# Patient Record
Sex: MALE | Race: BLACK OR AFRICAN AMERICAN | URBAN - METROPOLITAN AREA
[De-identification: names, ages, dates, MRNs, and addresses within clinical notes are randomized per-mention and may not be internally consistent; named-entity substitution may affect disease eponyms.]

---

## 2017-04-10 ENCOUNTER — HOSPITAL ENCOUNTER (OUTPATIENT)
Dept: BEHAVIORAL HEALTH | Facility: CLINIC | Age: 57
Discharge: HOME | End: 2017-04-10
Attending: SOCIAL WORKER | Admitting: SOCIAL WORKER
Payer: COMMERCIAL

## 2017-04-10 ENCOUNTER — TELEPHONE (OUTPATIENT)
Dept: BEHAVIORAL HEALTH | Facility: CLINIC | Age: 57
End: 2017-04-10

## 2017-04-10 VITALS
HEIGHT: 66 IN | SYSTOLIC BLOOD PRESSURE: 172 MMHG | WEIGHT: 158 LBS | HEART RATE: 124 BPM | BODY MASS INDEX: 25.39 KG/M2 | DIASTOLIC BLOOD PRESSURE: 91 MMHG

## 2017-04-10 PROCEDURE — H0001 ALCOHOL AND/OR DRUG ASSESS: HCPCS

## 2017-04-10 ASSESSMENT — PAIN SCALES - GENERAL: PAINLEVEL: NO PAIN (0)

## 2017-04-10 ASSESSMENT — ANXIETY QUESTIONNAIRES
6. BECOMING EASILY ANNOYED OR IRRITABLE: NOT AT ALL
3. WORRYING TOO MUCH ABOUT DIFFERENT THINGS: NOT AT ALL
5. BEING SO RESTLESS THAT IT IS HARD TO SIT STILL: NOT AT ALL
GAD7 TOTAL SCORE: 0
7. FEELING AFRAID AS IF SOMETHING AWFUL MIGHT HAPPEN: NOT AT ALL
4. TROUBLE RELAXING: NOT AT ALL
2. NOT BEING ABLE TO STOP OR CONTROL WORRYING: NOT AT ALL
1. FEELING NERVOUS, ANXIOUS, OR ON EDGE: NOT AT ALL

## 2017-04-10 NOTE — PROGRESS NOTES
[facility]  [address] [place] [address]               ADULT CD ASSESSMENT      Additional Clinical Questions - Outpatient    Patient Name: Jerad Alvarado  Cell Phone:   Home: [phone] (home)    Mobile:   Telephone Information:   Mobile [phone]       Email:  BECKY  Emergency Contact: Linda Jang  wife   Tel: [phone]    ________________________________________________________________________      The patient is      With which race do you identify? / Black    Please list your family members and if they are living or , i.e. (grandparents, parents, step-parents, adoptive parents, number of siblings, half-siblings, etc.)     Mother   Living Father    No Step-mother   NA No Step-father NA   Maternal Grandmother    Fraternal Grandmother    Maternal Grandfather    NA Fraternal Grandfather NA   7 Sister(s) 6 Living and 1  3 Brother(s)   2 Living and 1    No Half-sister(s)   NA No Half-brother(s) NA             Who raised you? (parents, grandparents, adoptive parents, step-parents, etc.)    Mother    Have any of your family members or significant others had problems with mental illness or substance abuse?  Please explain.    Father, maternal grandmother    Do you have any children or Stepchildren? Yes, please explain: Lucas  26, Dorinda Jenkins  28, Clare 25    Are you being investigated by Child Protection Services? No    Do you have a child protection worker, probation office or ? No    How would you describe your current finances?  Doing okay    If you are having problems, (unpaid bills, bankruptcy, IRS problems) please explain:  No    If working or a student are you able to function appropriately in that setting? Yes    Describe your preferred learning style:  by hands-on practice    What personal strengths do you have that can help you get sober?  Godd father/, hard worker    Do you currently  self-administer your medications?  Yes    Have you ever:    Had to lie to people important to you about how much you monet?     No     Felt the need to bet more and more money?      No     Attempted treatment for a gambling problem?        No     Touched or fondled someone else inappropriately, or forced them to have sex with you against their will?       No     Are you or have you ever been a registered sex offender?        No     Is there any history of sexual abuse in your family?        No     Cutler obsessed by your sexual behavior (having sex with many partners, masturbating often, using pornography often?        No     Received therapy or stayed in the hospital for mental health problems?        No     Hurt yourself (cutting, burning or hitting yourself)?        No     Purged, binged or restricted yourself as a way to control your weight?      No       Are you on a special diet?       No       Do you have any concerns regarding your nutritional status?        No       Have you had any appetite changes in the last 3 months?        No       Have you had any weight loss or weight gain in the last 3 months?  If yes, how much gain or loss:     If weight patient gains more than 10 lbs or loses more than 10 lbs, refer to program RN /  Attending Physician for assessment.    No        Was the patient informed of BMI?      Above,  General nutrition education   Yes     Do you have any dental problems?        No     Lived through any trauma or stressful events?        Yes, If yes explain: his sister , 3 years ago from an unexpected heart attack.      In the past month, have you had any of the following symptoms related to the trauma listed above? (Dreams, intense memories, flashbacks, physical reactions, etc.)         No     Believed that people are spying on you, or that someone was plotting against you or trying to hurt you?       No     Believed that someone was reading your mind or could hear your thoughts or that  "you could actually read someone's mind, hear what another person was thinking?       No     Believed that someone or some force outside of yourself put thoughts in your mind that were not your own, or made you act in a way that was not your usual self?  Or have you ever thought you were possessed?         No     Believed that you were being sent special messages through the TV, radio or newspaper?         No     [place] things other people couldn't hear, such as voices?         No     Had visions when you were awake?  Or have you ever seen things other people couldn't see?       Yes, If yes explain: thought he saw ghost when he was a little kid.          Suicide Screening Questions:    1. Are you feeling hopeless about the present/future?   No   2. Have you ever had thoughts about taking your life?   No   3. When did you have these thoughts? NA   4. Do you have any current intent or active desire to take your life?   No   5. Do you have a plan to take your life?    No   6. Have you ever made a suicide attempt?   No   7. Do you have access to pills, guns or other methods to kill yourself?   No       Risk Status - Use as Guide/Example    Ideation - Active  Thoughts of suicide Intent to follow  Through on suicide Plan for completing  suicide    Yes No Yes No Yes No   Emergent X  X  X    Urgent / Non-Emergent X  X   X   Non-Urgent X   X  X   No Current / Active Risk (Past 6 Months)  X  X  X   Jerad Alvarado No No No       Additional Risk Factors: No addtional risk factors   Protective Factors:  Having people in his/her life that would prevent the patient from considering committing suicide (i.e. young children, spouse, parents, etc.)     Risk Status:    Emergent? No  Urgent / Non-Emergent?  No  Present / Non- Urgent? Yes, Document in Epic / Critical MediaAR to counselor, Collaborate with patient / client to develop \"Patient Safety Plan\", Address in Treatment Plan, Continuous monitoring, assessment and intervention and Address in " Discharge / Transition Plan      No Current Risk? See above    Additional information to support suicide risk rating: See Above    Mental Status Assessment    Physical Appearance/Attire:  Appears stated age  Hygiene:  well groomed  Eye Contact:  at examiner  Speech:  regular  Speech Volume:  regular  Speech Quality: fluid  Cognitive/Perceptual:  reality based  Cognition:  memory intact   Judgment:  intact  Insight:  intact  Orientation:  time, place, person and situation  Thought:  logical   Hallucinations:  none  General Behavioral Tone:  cooperative  Psychomotor Activity:  no problem noted  Gait:  no problem  Mood:  normal and subdued, flat, GUZMAN was .048 which may have effected his mood.  Affect:  flat/none and blunted/restricted    Criteria for Diagnosis  DSM-5 Criteria for Substance Abuse    SUMMARY OF CHEMICAL DEPENDENCY SYMPTOMS ACKNOWLEDGED BY THE PATIENT:  He identifies 4 of the DSM-V criteria for diagnosis of substance dependence.  He spends a good deal of time using alcohol, has continued to use despite major obligations at home.  He has continued to use despite persistent recurrent interpersonal problems, especially with his wife and his occupational and his drinking has affected his work.      IMPRESSION:   1.  Alcohol use disorder, moderate, F10.20/303.90.   2.  Tobacco use disorder, Z72.20/305.1.      [facility] PLACEMENT CRITERIA:   DIMENSION 1:  Intoxication/withdrawal:  1.  On the date of his evaluation, his UA was not available as he could not produce a specimen.  His GUZMAN was 0.048, although he states he has not drank since 6:00 yesterday when he had a total of 4 drinks.  He did identify current symptoms of alcohol withdrawal, namely elevated pressure, but he was still under the influence and does have a history of high blood pressure, although he took his blood pressure medication this morning.  He denies a history of any previous detoxes.  He states around 9:00 this a.m., he went to [facility] where they mohan blood, determined that he was intoxicated, but not in need of detoxification.      DIMENSION 2:  Biomedical condition:  1.  Client denies having any chronic biomedical NA and AA conditions that would interfere with CD treatment.  He does endorse having the following medical conditions, namely hypertension, high cholesterol and diabetes managed with oral meds.  He is currently on glipizide, metformin, hypertensive medications and high cholesterol medications.  He does have insurance.  He does have a PCP in [place].  His blood pressure remains high today which I believe may be a factor of withdrawal.  He was checked out at the [facility] who said he did not need to be admitted and was stable enough to go home.        DIMENSION 3:  Emotional Behavioral:  1.  On the date of assessment, his PHQ-9 was 0/27, his SHEFALI-7 was 0/21.  He reported never being diagnosed with any MI issues.  Is not on any psych medications nor is he seeing a therapist.  He denies a history of SI/SA/SIB/HI/HA.      He grew up in [place], [place] until the age of 22, then went into the , has been in [place] since .  He was raised by his mom.  States he had a fun childhood.  He had 10 siblings, 3 brothers and 7 sisters.  He was the 8th one.  One sister and 1 brother have .  His father and maternal grandmother were alcoholic.  He denies a history of any mental health issues in the family.  Growing up states he got normal whippings from his mom as was common then.  It felt that his mom supported him 100% as best she could.      DIMENSION 4:  Readiness for Change:  3.  He verbalizes motivation to abstain from all mood-altering chemicals, but has lacked the consistent behavior to support abstinence.  He is at the precontemplative state of change.  He was cooperative with the evaluation process, appeared compliant with the idea of sobriety and given treatment recommendations.  However, he said he really  did not think he had a drinking problem and is here mainly because of pressure from his work and his wife.      DIMENSION 5:  Relapse potential:  3.  Jerad has continued to abuse alcohol despite negative consequences in multiple life areas.  He has had no CD treatments.  He lacks relapse prevention and sober living refusal skills.  There is a family history of addiction, father and maternal grandmother.  He appeared to be severely minimizing his drinking and reported having 4 drinks by 6:00 p.m. yesterday, but blew over a 0.20 at work this morning and still a 0.04 at 4:10 this afternoon.  He is at high risk of relapse because he has been a daily drinker for over a year, severely minimizing his use, does not think he has a problem and is only here to placate his work and wife.  He appears to be solely externally motivated at this time.      DIMENSION 6:  Recovery environment:  2.  Jerad lives with his supportive wife of 31 years, but has continued to drink excessively on a daily basis for the last year.  He said he has no friends and lacks healthy chemically free leisure activities and interests.  He denies a history of any legal issues and has a 's license.  He has very minimal support beyond his immediate family.  He has never been to  or any sober support group and is not active in any [facility].      RECOMMENDATIONS:   1.  That he abstain from alcohol and all mood-altering drugs unless prescribed by a health caregiver familiar with the patient's addiction.   2.  That he enter and complete IOP at [facility] or similar and follow counselor's recommendations.   3.  That he develop a sober support network.   4.  That he continue to receive appropriate medical and psychiatric services as needed.     5.  That he attend a minimum of one 12-step sober support group a week.   6.  That he enter  or similar.  If he is unable to maintain sobriety in an IOP setting.        INITIAL PROBLEM LIST:   1.  He lacks  relapse prevention and sober living skills.   2.  He appears to have no insight into the seriousness of his problem and appears to be significantly minimizing his alcohol use and the consequences.   3.  He appears to be solely externally motivated for treatment at this time by work and his wife.   4.  He has a very minimal sober support system beyond his immediate family.         Patient/Client is willing to follow treatment recommendations.  Yes    [place], [facility]      Vulnerable Adult Checklist for OUTPATIENTS     1.  Do you have a physical, emotional or mental infirmity or dysfunction?       No    2.  Does this issue impair your ability to provide for your own care without help, including providing yourself with food, shelter, clothing, healthcare or supervision?       No    3.  Because of this issue, I need assistance to protect myself from maltreatment by others.      No    Based on the above information:    This person is not a functional Vulnerable Adult according to [place] Statute 626.5572 subdivision 21.             This person has a history of abuse, but is assessed as stable and not in need of an individual abuse prevention plan beyond the program abuse prevention plan.

## 2017-04-10 NOTE — PROGRESS NOTES
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 support does the person have for making changes? What structure/stability does the person have in his or her daily life that will increase the likelihood that changes can be sustained? What problems exist in the person s environment that will jeopardize getting/staying clean and sober?)     Jerad lives with his supportive wife of 31 years, but has continued to drink excessively on a daily basis for the last year. He said he has no friends and lacks healthy chemically free leisure activities and interests. He denies a history of any legal issues and has a DL. He has very minimal support beyond his immediate family. He has never been to AA, any sober support group and is not active in any Christianity.         Client Choice/Exceptions   Would you like services specific to language, age, gender, culture, Alevism preference, race, ethnicity, sexual orientation or disability?  Yes - EOP at [facility].    What particular treatment choices and options would you like to have? See above.    Do you have a preference for a particular treatment program? See above.    Criteria for Diagnosis     Criteria for Diagnosis  DSM-5 Criteria for Substance Use Disorder  Instructions: Determine whether the client currently meets the criteria for Substance Use  Disorder using the diagnostic criteria in the DSM-V pp.481-587. Current means during the most recent 12 months outside a facility that controls access to substances    Category of Substance Severity (ICD-10 Code / DSM 5 Code)     Alcohol Use Disorder Moderate  (F10.20) (303.90)   Cannabis Use Disorder NA   Hallucinogen Use Disorder NA   Inhalant Use Disorder NA   Opioid Use Disorder NA   Sedative, Hypnotic, or Anxiolytic Use Disorder NA   Stimulant Related Disorder NA   Tobacco Use Disorder Mild    (Z72.0) (305.1)   Other (or unknown) Substance Use Disorder NA       Collateral Contact Summary   Number of contacts made: 1  His wife of 31 years.    Contact with referring person:  Self/wife.    If court related records were reviewed, summarize here: NA    Information from collateral contacts was significantly different from information from the client and lead to different risk ratings. Summarize here: see below.      Rule 25 Assessment Summary and Plan   's Recommendation    1) Abstain from alcohol and all mood altering drugs unless prescribed by a healthcare giver familiar with the patient's addiction.    2) Enter and complete IOP at [facility] or similar and follow counselor recommendations.   3) Develop a sober supportive network.  4) Continue to receive appropriate medical and psychiatric services as needed.  5) Attend a minimum of one 12 step or similar sober support group a week.  6) That he enter  or similar if he is unable to maintain sobriety in an IOP setting.   7) That he do random BAC's while in tx and possibly at work at least during the duration of Phase I and Phase II of tx.          Collateral Contacts     Name:    Linda Alvarado   Relationship:    wife   Phone Number:    [phone] Releases:    Yes     Linda said she has been concerned about Jerad's excessive drinking for about 4-5 years, more so in the last year. She said he drinks every night, claims to have only one drink but often  appears to be inebriated. He denies that he is drinking too much, even though she said he smells of etoh. She said she received a call this AM to pick him up at work because he smelled of etoh, had an elevated GUZMAN, and appeared wobbly and unsteady. She said she picked him up around 9, and he smelled of etoh but seemed ok. She took him to the [facility] [place] where they took blood, confirmed that he was intoxicated, that his BP was elevated but released him thinking he didn't need admission.       Collateral Contacts     Name:    Maylin   Relationship:    HR at [employer]   Phone Number:    [phone]   Releases:    Yes     I couldn't get through using that number.     ollateral Contacts      A problematic pattern of alcohol/drug use leading to clinically significant impairment or distress, as manifested by at least two of the following, occurring within a 12-month period:    A great deal of time is spent in activities necessary to obtain alcohol, use alcohol, or recover from its effects.  Recurrent alcohol/drug use resulting in a failure to fulfill major role obligations at work, school or home.  Continued alcohol use despite having persistent or recurrent social or interpersonal problems caused or exacerbated by the effects of alcohol/drug.  Important social, occupational, or recreational activities are given up or reduced because of alcohol/drug use.      Specify if: In early remission:  After full criteria for alcohol/drug use disorder were previously met, none of the criteria for alcohol/drug use disorder have been met for at least 3 months but for less than 12 months (with the exception that Criterion A4,  Craving or a strong desire or urge to use alcohol/drug  may be met).     In sustained remission:   After full criteria for alcohol use disorder were previously met, non of the criteria for alcohol/drug use disorder have been met at any time during a period of 12 months or longer (with the exception that  Criterion A4,  Craving or strong desire or urge to use alcohol/drug  may be met).   Specify if:   This additional specifier is used if the individual is in an environment where access to alcohol is restricted.    Mild: Presence of 2-3 symptoms    Moderate: Presence of 4-5 symptoms    Severe: Presence of 6 or more symptoms

## 2017-04-10 NOTE — TELEPHONE ENCOUNTER
S:  4/10/17 Received call from Linda (the spouse) referring client for   Chemical Dependency evaluation for OP TX.   B:  Reported the client drinks alcohol daily. He denies other drug use,  denies previous CD TX, denies being prescribed antidepressants,   denies legal issues.   A:  CD evaluation   Medical concerns: Hx of Diabetes, Hx of High Blood Pressure, and   Hx of High Cholesterol.   R:  Scheduled for today 4/10/17 @ 3:30 pm, routed to CD Program. COLE

## 2017-04-11 ASSESSMENT — ANXIETY QUESTIONNAIRES: GAD7 TOTAL SCORE: 0

## 2017-04-11 ASSESSMENT — PATIENT HEALTH QUESTIONNAIRE - PHQ9: SUM OF ALL RESPONSES TO PHQ QUESTIONS 1-9: 0

## 2017-04-11 NOTE — PROGRESS NOTES
CHEMICAL DEPENDENCY ASSESSMENT      EVALUATION COUNSELOR:  HERRERA Boles, [facility].    PATIENT'S ADDRESS:  [address] [place] [address]  [id number].   PHONE NUMBER:  [phone].   STATISTICS:  Age:  56.  .  Sex:  Male.   DATE OF ASSESSMENT:  04/10/2017.   REFERRAL SOURCE:  Self.   DATE OF DICTATION:  04/10/2017.     HR at [employer] Maylin Montano  [phone]  Fax [phone]     REASON FOR ASSESSMENT:  Jerad Alvarado states he reported to work this morning and around 7:00 or so was confronted at work for appearing intoxicated.  They state he was wobbly, smelled of alcohol.  His GUZMAN was over 0.20.  His wife was called.  She was asked to pick him up and he is temporarily suspended until he has an assessment, etc.      OUTPATIENT HEALTH HISTORY:  On the date of assessment, his blood pressure was high initially 172/91 with a heart rate of 124, about an hour later, 164/97, with heart rate of 114.  His BMI was 25.50.  His GUZMAN at 4:15 p.m. was 0.048.  He states he has a history of high blood pressure, high cholesterol and diabetes managed with oral medications.      He is on the following medications:  Glipizide b.i.d., metformin and then hypertension medications and high cholesterol medications.  He did not know their names.  He denies any current pain.  Denies any current allergies.  States he has insurance and a doctor in [place].      HISTORY OF PREVIOUS TREATMENT AND COUNSELING:  He denies a history of any previous chemical dependency treatment counseling, 12-step involvement or detoxes.  He states this is his first assessment.      HISTORY OF ALCOHOL AND DRUG USE:  States he first drank alcohol around the age of 25, states he did not drink much in his 20s, 30s and 40s.  States in the last year or so he has been drinking daily, typically a beer and an occasional shot of hard liquor.  States his last use was 04/09/2017 having 3 shots of errol and a beer at 6:00 at night.      It appears the patient is  probably significantly minimizing his use of alcohol.  He states his last use of alcohol was 04/09/2017 at 6:00 yet his GUZMAN at 4:15 on 04/10 was still 0.048.      States he used pot once as a teenager.  States at 25 he occasionally snorted and smoked coke, last use was at 25.  States he used acid at 23 a few times.  States he started smoking at 25 and then quit for a long time.  States he has smoked from [age] about 4 cigarettes a day.      SUMMARY OF CHEMICAL DEPENDENCY SYMPTOMS ACKNOWLEDGED BY THE PATIENT:  He identifies 4 of the DSM-V criteria for diagnosis of substance dependence.  He spends a good deal of time using alcohol, has continued to use despite major obligations at home.  He has continued to use despite persistent recurrent interpersonal problems, especially with his wife and his occupational and his drinking has affected his work.      SUMMARY OF COLLATERAL DATA:  I did gather collateral data from his wife, Linda, his wife of 31 years.  She states she has been concerned about his excessive drinking for about 4-5 years, more so in the last year.  She states that he drinks every night and claims to have only 1 drink but often appears quite inebriated.  He denies that drinking very much, although he often smells of alcohol.  She states she received the call from work this morning to pick him up because he smelled of alcohol and had an elevated GUZMAN and appeared wobbly and unsteady.  She states she picked him up around 9:00.  He smelled of alcohol, but seemed okay.  She took him to the [facility] where they took blood and confirmed that he was intoxicated.  His blood pressure was elevated but released him thinking he did not need admission.      MENTAL HEALTH STATUS:  On the date of assessment, he appeared his stated age.  He was well groomed, maintained eye contact at the examiner.  Speech rate regular.  Speech volume regular.  Speech quality fluid.  Perception reality based.  Memory intact.   Judgment intact.  Insight intact.  Oriented to time, place, person and situation.  Thoughts logical, evidenced no hallucinations.  General behavior tone was cooperative, evidenced no psychomotor problems.  Gait:  No problem.  Mood was normal and subdued, flat.  GUZMAN was 0.048, which may have affected his mood.  Affect was flat, none and blunted and restricted.      VULNERABLE ADULT ASSESSMENT:  Jerad Alvarado is not a functional vulnerable adult according to [place] Statute 626.5572, subdivision 21.      IMPRESSION:   1.  Alcohol use disorder, moderate, F10.20/303.90.   2.  Tobacco use disorder, Z72.20/305.1.      [facility] PLACEMENT CRITERIA:   DIMENSION 1:  Intoxication/withdrawal:  1.  On the date of his evaluation, his UA was not available as he could not produce a specimen.  His GUZMAN was 0.048, although he states he has not drank since 6:00 yesterday when he had a total of 4 drinks.  He did identify current symptoms of alcohol withdrawal, namely elevated pressure, but he was still under the influence and does have a history of high blood pressure, although he took his blood pressure medication this morning.  He denies a history of any previous detoxes.  He states around 9:00 this a.m., he went to [facility] where they mohan blood, determined that he was intoxicated, but not in need of detoxification.      DIMENSION 2:  Biomedical condition:  1.  Client denies having any chronic biomedical NA and AA conditions that would interfere with CD treatment.  He does endorse having the following medical conditions, namely hypertension, high cholesterol and diabetes managed with oral meds.  He is currently on glipizide, metformin, hypertensive medications and high cholesterol medications.  He does have insurance.  He does have a PCP in [place].  His blood pressure remains high today which I believe may be a factor of withdrawal.  He was checked out at the [facility] who said he did not need to be admitted and  was stable enough to go home.        DIMENSION 3:  Emotional Behavioral:  1.  On the date of assessment, his PHQ-9 was 0/27, his SHEFALI-7 was 0/21.  He reported never being diagnosed with any MI issues.  Is not on any psych medications nor is he seeing a therapist.  He denies a history of SI/SA/SIB/HI/HA.      He grew up in [place], [place] until the age of 22, then went into the , has been in [place] since .  He was raised by his mom.  States he had a fun childhood.  He had 10 siblings, 3 brothers and 7 sisters.  He was the 8th one.  One sister and 1 brother have .  His father and maternal grandmother were alcoholic.  He denies a history of any mental health issues in the family.  Growing up states he got normal whippings from his mom as was common then.  It felt that his mom supported him 100% as best she could.      DIMENSION 4:  Readiness for Change:  3.  He verbalizes motivation to abstain from all mood-altering chemicals, but has lacked the consistent behavior to support abstinence.  He is at the precontemplative state of change.  He was cooperative with the evaluation process, appeared compliant with the idea of sobriety and given treatment recommendations.  However, he said he really did not think he had a drinking problem and is here mainly because of pressure from his work and his wife.      DIMENSION 5:  Relapse potential:  3.  Jerad has continued to abuse alcohol despite negative consequences in multiple life areas.  He has had no CD treatments.  He lacks relapse prevention and sober living refusal skills.  There is a family history of addiction, father and maternal grandmother.  He appeared to be severely minimizing his drinking and reported having 4 drinks by 6:00 p.m. yesterday, but blew over a 0.20 at work this morning and still a 0.04 at 4:10 this afternoon.  He is at high risk of relapse because he has been a daily drinker for over a year, severely minimizing his use, does not  think he has a problem and is only here to placate his work and wife.  He appears to be solely externally motivated at this time.      DIMENSION 6:  Recovery environment:  2.  Jerad lives with his supportive wife of 31 years, but has continued to drink excessively on a daily basis for the last year.  He said he has no friends and lacks healthy chemically free leisure activities and interests.  He denies a history of any legal issues and has a 's license.  He has very minimal support beyond his immediate family.  He has never been to  or any sober support group and is not active in any Jewish.      RECOMMENDATIONS:   1.  That he abstain from alcohol and all mood-altering drugs unless prescribed by a health caregiver familiar with the patient's addiction.   2.  That he enter and complete IOP at [facility] or similar and follow counselor's recommendations.   3.  That he develop a sober support network.   4.  That he continue to receive appropriate medical and psychiatric services as needed.     5.  That he attend a minimum of one 12-step sober support group a week.   6.  That he enter  or similar.  If he is unable to maintain sobriety in an IOP setting.  7.  That he do random BAC's while in tx and possibly at work at least during the duration of Phase I and Phase II of tx.          INITIAL PROBLEM LIST:   1.  He lacks relapse prevention and sober living skills.   2.  He appears to have no insight into the seriousness of his problem and appears to be significantly minimizing his alcohol use and the consequences.   3.  He appears to be solely externally motivated for treatment at this time by work and his wife.   4.  He has a very minimal sober support system beyond his immediate family.         This information has been disclosed to you from records protected by Federal confidentiality rules (42 CFR part 2). The Federal rules prohibit you from making any further disclosure of this information unless  further disclosure is expressly permitted by the written consent of the person to whom it pertains or as otherwise permitted by 42 CFR part 2. A general authorization for the release of medical or other information is NOT sufficient for this purpose. The Federal rules restrict any use of the information to criminally investigate or prosecute any alcohol or drug abuse patient.      RODNEY CARLOS [facility] [place], LICSW             D: 04/10/2017 19:03   T: 04/10/2017 20:50   MT: ANA MARIA      Name:     CAYLA CHILDRESS   MRN:      [id number]        Account:      [id number]   :      1960           Visit Date:   04/10/2017      Document: [id number]

## 2017-04-11 NOTE — TELEPHONE ENCOUNTER
4/11/2017  Please schedule patient for orientation at [facility] for Wed 4/12/17 and start Karo's group when there is room. He was referred to BJS's office as he has a high deductible. He has [facility]. I talked with Lucio at [phone] who said his auth is managed by local [facility]  [phone], so I will e-mail the usual [facility] form to Basil GUTHRIEAR    Name:   Jerad Alvarado YOB: 1960 Age:  56 year old Gender:  male   Insurance:   [facility] form with be faxed to Karo   Precipitating Event:   Pt showed up for work intoxicated on 4/10/17, sent home, suspended and required to have a CD eval.    DOC:   Alcohol and Nicotine  Additional abused substances:   None   Medical:   Hypertension, High Cholesterol and Diabetes non-insulin dependent   Mental Health:   No current clinical mental health issues   Previous Treatments:  No prior IP detoxification admission(s).  No prior CD treatment(s).   Psychosocial:    3 adult child(eran)  Stable housing and no concerns  Minimal support network   Suicide Screening Questions:     1. Are you feeling hopeless about the present/future? No   2. Have you ever had thoughts about taking your life? No   3. When did you have these thoughts? NA   4. Do you have any current intent or active desire to take your life? No   5. Do you have a plan to take your life?  No   6. Have you ever made a suicide attempt? No   7. Do you have access to pills, guns or other methods to kill yourself? No                  Risk Status - Use as Guide/Example     Ideation - Active  Thoughts of suicide Intent to follow  Through on suicide Plan for completing  suicide    Yes No Yes No Yes No   Emergent X   X   X     Urgent / Non-Emergent X   X     X   Non-Urgent X     X   X   No Current / Active Risk (Past 6 Months)   X   X   X   Jerad Alvarado No No No         Additional Risk Factors: No addtional risk factors   Protective Factors:  Having people in his/her life that would prevent the patient from  "considering committing suicide (i.e. young children, spouse, parents, etc.)      Risk Status:     Emergent? No  Urgent / Non-Emergent?  No  Present / Non- Urgent? Yes, Document in Epic / SBAR to counselor, Collaborate with patient / client to develop \"Patient Safety Plan\", Address in Treatment Plan, Continuous monitoring, assessment and intervention and Address in Discharge / Transition Plan   No Current Risk? See above     Additional information to support suicide risk rating: See Above          Additional Info as needed: NA       "

## 2017-04-12 ENCOUNTER — HOSPITAL ENCOUNTER (OUTPATIENT)
Dept: BEHAVIORAL HEALTH | Facility: CLINIC | Age: 57
End: 2017-04-12
Attending: SOCIAL WORKER
Payer: COMMERCIAL

## 2017-04-12 ENCOUNTER — BEH TREATMENT PLAN (OUTPATIENT)
Dept: BEHAVIORAL HEALTH | Facility: CLINIC | Age: 57
End: 2017-04-12
Attending: FAMILY MEDICINE

## 2017-04-12 PROBLEM — F19.10 SUBSTANCE ABUSE (H): Status: ACTIVE | Noted: 2017-04-12

## 2017-04-12 PROCEDURE — H2035 A/D TX PROGRAM, PER HOUR: HCPCS | Mod: HQ

## 2017-04-13 NOTE — PROGRESS NOTES
Initial Services Plan        Before your first treatment group, please do the following    Immediate health & safety concerns:   Go to the emergency room if you start to have withdrawal symptoms.  Look for sober housing and a supportive social network.  Look for a support network (such as AA, NA, DBT group, a Judaism group, etc.)    Suggestions for client during the time between intake & completion of treatment plan:  Introduce yourself to the treatment group.  Spend time getting to know your peers.  Complete the problem list for your treatment plan.  Start drug and alcohol use history.  Review your patient or client handbook.  Identify concerns about whom to ask for family week    Client issues to be addressed in the first treatment sessions:  Identify motivations(s) for coming to treatment, i.e. legal, family, job, self  Identify concerns about going to group, i.e. fear of talking in group      HERRERA Ngo  4/12/2017  9:29 PM

## 2017-04-13 NOTE — TELEPHONE ENCOUNTER
----- Message from Karo Dickinson sent at 4/12/2017  8:13 PM CDT -----  Regarding: Phase 1  Please schedule this client for Phase 1 Maryana [id number] starting 04/24/2017 for 20 sessions.  Specifically for 5:30pm.    Thank you,  Karo

## 2017-04-13 NOTE — PROGRESS NOTES
"Orientation to [facility] Program  Date 04/12/2017  Time: 7:30pm Duration: 1 hour    D - Client attended orientation on this date by himself. Client was given an orientation packet which was reviewed with in it's entirety. The following was specifically reviewed with the client: Program philosophy, treatment goals, program schedules, education components, the family program, using treatment materials, program rules, client rights/responsibilities, information on HIV, STDS, Hepatitis, TB, information on use while pregnant, opioid information and Narcan Information, program abuse prevention plan/reporting protocol, client grievance procedure, risks of treatment, confidentiality, treatment agreement, facility tour/safety information and information on co-occurring disorders. Client was given information about insurance and the central billing office phone number if there are any further questions. Client was also given information on Stages of Change and PAW (post-acute withdrawal). Client was given counselor's number and also manager's number if there are further concerns.     I - Client signed paperwork, toured the facility, scheduled first group Phase 1 session and discussed treatment planning. Client also filled out goals for treatment. They include:  1) \"sobriety\"   2)  3)    A - Client appeared engaged.      P- Client will review paperwork and materials.  Client will come ready and prepared for his first session on 04/24/2017  Client will set up a time for treatment planning.      HERRERA Ngo      "

## 2017-04-13 NOTE — TELEPHONE ENCOUNTER
4/13/2017    Client's HR contact Maylin called [employer] and he forwarded the messages to me. She is looking for when he could return to work on and some other information I was unable to catch. She left her number for a call back.     Karo Dickinson, HERRERA

## 2017-04-13 NOTE — PROGRESS NOTES
Outcome of vulnerable Adult Assessment for Outpatients:    1.  Do you have a physical, emotional or mental infirmity or dysfunction?       Yes (explain) - chemical dependency    2.  Does this issue impair your ability to provide for your own care without help, including providing yourself with food, shelter, clothing, healthcare or supervision?       No      3.  Because of this issue, I need assistance to protect myself from maltreatment by others.      No    Based on the above information:    This person is not a functional Vulnerable Adult according to [place] Statute 626.5572 subdivision 21.      HERRERA Ngo

## 2017-04-13 NOTE — TELEPHONE ENCOUNTER
4/13/2017    Left Maylin a message letting her know I'm the counselor and contact from here on out. I left my number and times I'm available for a call back.    HERRERA Ngo

## 2017-04-20 NOTE — TELEPHONE ENCOUNTER
4/20/2017  Linda Alvarado and counselor exchanged emails this morning. See below:    ----------------------------------------------------------------  Ok.  I just left a message for Shae. Thank you for you help.    Linda Alvarado  [facility] [place] [place] [facility]    [phone]      [email or web address]    On Thu, Apr 20, 2017 at 11:51 AM, Karo Dickinson <[email or web address]> wrote:  Hello again,   I forgot to mention in my last email that he was supposed to contact the billing office. I am not sure if that s been done yet, but the number there is [phone].   Thanks,   HERRERA Ngo     From: Linda Alvarado [[email or web address]]   Sent: Thursday, April 20, 2017 11:19 AM  To: Karo Dickinson  Subject: Re: Jerad Jenny     That does help, Thank you.     Linda Alvarado  [facility] [place] [place] [facility]    [phone]      On Thu, Apr 20, 2017 at 11:16 AM, Karo Dickinson <[email or web address]> wrote:  I spoke with Maylin from his HR and she made it sound like he ll come to group for a week and then her and I will touch base the 26th/27th and if everything s going well with him he could go back the first week of May.    Hope that answers your question.   HERRERA Ngo     From: Linda Alvarado [[email or web address]]   Sent: Thursday, April 20, 2017 11:02 AM  To: Karo Dickinson  Subject: Re: Jerad Jangy     ?Thank you for responsding.   I do have a question regarding when Jerad will be able to go back to work?  You might not know the answer right now and if it could be next week I don't feel I need to fill out the paperwork for FMLA or Disability?    Let me now what you think?   Thanks      Linda Alvarado  [facility] [place] [place] [facility]    [phone]  [phone]      [email or web address]     On Thu, Apr 20, 2017 at 10:52 AM, Karo Dickinson <[email or web address]> wrote:  Hello,     I am unable to fill out FMLA paperwork. You can go to your primary provider or clinic and they  can fill those out.   Please let me know if you have any other questions.     Karo Dickinson, [facility]  [address], [address]  [place] [address]  Ph. [phone]  Fax. [phone]  [email or web address]     From: Linda Alvarado [[email or web address]]   Sent: Thursday, April 20, 2017 7:39 AM  To: Karo Dickinson  Subject: Jerad Alvarado     Good Morning     Attached is paper work for eJrad Alvarado who will start on 4/24/17 for Short Term Disability or FMLA.  If you could please fill out the Health Care Provider section it would be greatly appreciated.     You can email it back to me at this email address.     Thanks  Linda Alvarado  [facility]    [phone]

## 2017-04-24 ENCOUNTER — HOSPITAL ENCOUNTER (OUTPATIENT)
Dept: BEHAVIORAL HEALTH | Facility: CLINIC | Age: 57
End: 2017-04-24
Attending: SOCIAL WORKER
Payer: COMMERCIAL

## 2017-04-24 PROCEDURE — H2035 A/D TX PROGRAM, PER HOUR: HCPCS | Mod: HQ

## 2017-04-24 NOTE — PROGRESS NOTES
Comprehensive Assessment Summary     Based on client interview, review of previous assessments and   comprehensive assessment interview the following diagnosis and recommendations are:     Patient: Jerad Alvarado  MRN; [id number]   : 1960  Age: 56 year old Sex: male     Client meets criteria for:  1. Alcohol use disorder, moderate, F10.20/303.90.   2. Tobacco use disorder, mild, Z72.20/305.1.     Dimension One: Acute Intoxication/Withdrawal Potential     Ratin      (Consider the client's ability to cope with withdrawal symptoms and current state of intoxication)     Client was intoxicated at time of assessment. Client blew a 0.048 on 04/10/2017. Client reports last date of use of alcohol as 04/10/2017. Client is a daily nicotine user. Client reports smoking about 4 cigarettes/day. Client denied any withdrawal symptoms in the past 30 days or the past 12 months. Client reported it was a rough first two weeks without using. Client reports his appetite has increased in these two weeks. Client has a history of high blood pressure, and takes blood pressure medication. Client denied any lifetime detox admissions. No other concerns in this dimension at this time.       Dimension Two: Biomedical Condition and Complications    Ratin   (Consider the degree to which any physical disorder would interfere with treatment for substance abuse, and the client's ability to tolerate any related discomfort; determine the impact of continued chemical use on the unborn child if the client is pregnant)     Client reports a diagnosis of hypertension, high cholesterol and diabetes. Client reports being prescribed medications for biomedical health symptoms and reports taking them as prescribed. Client reports having a PCP in [place]. Client reports taking glipizide, metformin, hypertensive medications and high cholesterol medications. Client went to the [facility] on the morning of 04/10/2017 and was told he did not  need detox and could go home. No other concerns in this dimension at this time.     Dimension Three: Emotional/Behavioral/Cognitive Conditions & Complications    Ratin   (Determine the degree to which any condition or complications are likely to interfere with treatment for substance abuse or with functioning in significant life areas and the likelihood of risk of harm to self or others)     Client reports growing up in [place] and had a big family. Client reports his 7th out of 8 children. Client reports entering the  and being in [place] since . Client denies any mental health symptoms. Client reports he's never been diagnosis with a mental health issue. Client is not prescribed medications for mental health symptoms. Client denies any past or current therapy. Client denies a history of SI/SIB and suicide attempts. Client has the following protective factors: having people in his/her life that would prevent the patient from considering committing suicide (i.e. young children, spouse, parents, etc.). No other concerns in this dimension at this time.      Dimension Four: Treatment Acceptance/Resistance     Rating: 3    (Consider the amount of support and encouragement necessary to keep the client involved in treatment)     Client appears in the pre-contemplation stage of change. Client lacks consistent behavior to remain sober, but verbalizes motivation for sobriety. Client reported at time of assessment he didn't think he had a drinking problem and is following through with assessment and recommendations due to pressure from work and wife, indicating external motivation. No other concerns in this dimension at this time.     Dimension Five: Continued Use/Relapse Prevention     Rating: 3    (Consider the degree to which the client's recognizes relapse issues and has the skills to prevent relapse of either substance use or mental health problems)     Client reports at assessment he had never tried to  abstain from using. Client denies any past CD treatments. Client continued to use alcohol despite negative consequences in multiple areas of his life. Client appears to be minimizing his use while stating he had for drinks the night before but blew over a 0.20 at work morning of the assessment. Client reports he's been a daily drinker for over a year and does not think he has a problem. Client lacks relapse prevention and sober living refusal skills.       Dimension Six: Recovery Environment     Ratin    (Consider the degree to which key areas of the client's life are supportive of or antagonistic to treatment participation and recovery)     Client reports he lives with his supportive wife of 31 years but has continued to drink excessively on a daily basis for the last year. Client reports he drinks alone/isolated at home. Client reports he has no friends and lacks healthy chemically free leisure activities and interests. He denies a history of any legal issues and has a 's license. He has very minimal support beyond his immediate family. He has never been to AA or any sober support group and is not active in any [employer]. Client is employed full time but currently on leave due to coming to work intoxicated. No other concerns in this dimension at this time.     I have reviewed the information on the assessment, psychosocial and medical history and checklist:        it is current    HERRERA Ngo

## 2017-04-25 ENCOUNTER — HOSPITAL ENCOUNTER (OUTPATIENT)
Dept: BEHAVIORAL HEALTH | Facility: CLINIC | Age: 57
End: 2017-04-25
Attending: SOCIAL WORKER
Payer: COMMERCIAL

## 2017-04-25 ENCOUNTER — BEH TREATMENT PLAN (OUTPATIENT)
Dept: BEHAVIORAL HEALTH | Facility: CLINIC | Age: 57
End: 2017-04-25

## 2017-04-25 PROCEDURE — H2035 A/D TX PROGRAM, PER HOUR: HCPCS | Mod: HQ

## 2017-04-25 NOTE — PROGRESS NOTES
Patient Safety Plan Template    Name:   Jerad Alvarado YOB: 1960 Age:  56 year old MR Number:  [id number]   Step 1: Warning signs (Thoughts, images, mood, situation, behavior) that a crisis may be developin. Not working     2. NA     3. NA     Step 2: Internal coping strategies - Things I can do to take my mind off of my problems without contacting another person (relaxation technique, physical activity):     1. Go for a walk     2. Go see granddaughter     3. NA     Step 3: People and social settings that provide distraction:     1. Name: Lilian Bowling - daughter   Phone: [phone]   2. Name: NA   Phone: NA   3. Place: family's house   4. Place: work     The one thing that is most important to me and worth living for is: me     Step 4: People whom I can ask for help:     1. Name: Linda - wife   Phone: [phone]     2. Name: Tammy - daughter   Phone: [phone]     3. Name: Demetrio - brother   Phone: [phone]     Step 5: Professionals or agencies I can contact during a crisis:     1. Clinician Name: Dr. Oconnor   Phone: [phone]   Clinician Pager or Emergency Contact #: NA     2. Clinician Name: Dr. Davison   Phone: [phone]     Clinician Pager or Emergency Contact #: NA     3. Local Urgent Care Services: [facility]    Urgent Care Services Address: [place], [place]    Urgent Care Services Phone: [phone]     4. Suicide Prevention Lifeline Phone: 7-212-432-QVBK (3550)     Step 6: Making the environment safe:     1. Got all alcohol out of the house     2. Take medications as prescribed     Safety Plan Template 2008 [employer] Traylor is reprinted with the express permission of the authors.  No portion of the Safety Plan Template may be reproduced without the express, written permission.  You can contact the authors at [email or web address] or [email or web address].

## 2017-04-25 NOTE — TREATMENT PLAN
[facility]  Adult Chemical Dependency Program  Treatment Plan Requirements    These services are provided by the facility for each patient/client according to the individual's treatment plan:    Individual and group counseling    Education    Transition services    Services to address any co-occurring mental illness    Service coordination    Initial Treatment Plan Goals:  1. Complete all the requirements of Program Orientation.  2. Maintain medication compliance throughout the program.  3. Complete requirements for workshop/skills groups based on identified issues on your problem list.  4. Complete the support group attendance feedback sheet weekly.  5. Gain family involvement in treatment process to address family issues from the problem list.  6. Attend and participate in all required groups per individual treatment plan.  7. Focus attention to individualized issues from the treatment plan.  8. Complete all requirements for UA's, alcohol screening tests and other testing.  9. Schedule a physical examination if recommended.    In addition to the above, complete all individual goals as specifically outlines on your treatment plan.    Criteria for discharge:  Patients/clients are discharged from the program following completion of the entire program including Phase I and II or acceptance of other post-treatment referrals such as long-term house, or aftercare at other facilities.  Patients/clients may also be discharged for inappropriate behavior or chemical use.      Favorable Discharge - Patients/clients have completed agreed upon treatment goals, understand their diagnosis and appear motivated about the follow-up care.    Guarded Discharge - Patients/clients have demonstrated some understanding of their diagnosis and recovery process, and have completed some of their treatment goals.  This prognosis also includes patients/clients who have completed some treatment goals but have not made  commitment to community support or follow through with referrals.    Unfavorable Discharge - Patients/clients have not completed agreed upon treatment goals due to their own choice, have limited understanding of their diagnosis, and have shown minimal or inconsistent behavior conducive to recovery.  Those patients/clients discharged due to behavioral problems will also be unfavorable discharges.                                  Adult CD Treatment Plan     Jerad Alvarado [id number]   1960 56 year old male        ------------------------------------------------------------------------------------------------------------------  Acute Intoxication/Withdrawal Potential     DIMENSION 1  RISK FACTOR: 0             AssignmentDate Source Prob/Goal/  Intervention Target  Date Initials Outcome Completion  Date   04/25/2017 Self -  Current, History -  Current, Collateral -  Current and Assessment -  Current  Problem: Substance use, cravings and urges.  Last use date was reported as 04/09/2017.     Goal: Develop effective strategies to maintain sobriety.   Be able to manage mild to moderate withdrawal symptoms.  Intervention: Report to counselor and group any alcohol or drug use. 09/12/17 MP Effective 08/15/2017         Biomedical Conditions and Complaints     DIMENSION 2  RISK FACTOR: 0             AssignmentDate Source Prob/Goal/  Intervention Target  Date Initials Outcome Completion  Date   04/25/2017 Self -  Current, History -  Current, Collateral -  Current and Assessment -  Current  Problem: Client has a medical diagnosis of hypertension, high cholesterol and diabetes.  Goal: Follow recommendations of medical provider.  Intervention: Continue to take prescribed medications and follow-up with medical interventions while in program.                 09/2017 MP Effective 08/15/2017        -----------------------------------------------------------------------------------------------------------------    Emotional/Behavioral/Cognitive Conditions and Complications     DIMENSION 3  RISK FACTOR: 0             AssignmentDate Source Prob/Goal/  Intervention Target  Date Initials Outcome Completion  Date   04/25/2017 Self -  Current, History -  Current, Collateral -  Current and Assessment -  Current  Problem: Denies any mental health issues  Goal: Improve self-esteem.  Intervention:    List 5 ways your addiction has impacted your mental health.    Intervention 2: Come to group daily with a positive affirmation.           05/24/17          Daily thru 09/2017 MP Effective           Completed          Completed 08/15/2017          05/24/2017          08/15/2017     -------------------------------------------------------------------------------------------------------------------     Readiness to Change     DIMENSION 4  RISK FACTOR: 0             AssignmentDate Source Prob/Goal/  Intervention Target  Date Initials Outcome Completion  Date   04/25/2017 Self -  Current, History -  Current, Collateral -  Current and Assessment -  Current  Problem: Client has consequences to self and others due to alcohol use. Client lacks internal motivation to stop using substances.    Goal: Understand the impact your substance use has had on you, your family and significant relationships.  Increase internal motivation.  Intervention: Present using history, consequences of use and 5 values violated.  Intervention 2:  Invite family and concerned persons to family program.  Intervention 3: Participate in spiritual care groups.   Intervention 4: Each week write down 3 reasons you want to remain sober.                               05/03/17            05/15 & 05/17/17 05/09/17      1x a week thru 09/2017 MP Effective                         Drinking increased when kids left house, tolerance, not taking medications,  "honesty with wife and family      Completed with wife      Attended      Completed 08/15/2017                              05/03/2017            05/15 & 05/17/2017      05/09/2017      08/15/2017     -------------------------------------------------------------------------------------------------------------------     Relapse/Continues Use/Continues Problem Potential     DIMENSION 5  RISK FACTOR: 1                 AssignmentDate Source Prob/Goal/  Intervention Target  Date Initials Outcome Completion  Date   04/25/2017 Self -  Current, History -  Current, Collateral -  Current and Assessment -  Current  Problem: Lacks a daily routine that includes healthy and sober living skills.     Goal: Develop sober coping and living skills. Identify personal triggers and relapse warning signs.  Intervention: Complete the triggers and cravings assignment and include alternative behaviors.  Intervention 2:  Identify and begin attending sober support groups.   Intervention 3: Client will read \"Barriers and Solutions\" and discuss in group.  Intervention 4:  Client will complete and present \"What Does Addiction Mean to Me?\"                     05/10/17            07/01/17        05/31/17          05/17/17 MP Effective                     Completed            Did not complete        Completed          Completed 08/15/2017                    05/10/2017            N/A     Recovery Environment     DIMENSION 6  RISK FACTOR: 1                 AssignmentDate Source Prob/Goal/  Intervention Target  Date Initials Outcome Completion  Date   04/25/2017 Self -  Current, History -  Current, Collateral -  Current and Assessment -  Current  Problem: Lacks sober support network. Client lacks sober leisure activities. Client has involvement with employer  Goal: Develop a sober support network. Resolve issues with employer. Reflect on treatment experience, celebrate accomplishments, anticipate challenges in new sober lifesytle and set " "goals.  Intervention: Complete the personal \"Recovery Care Assignment\"  1) RC Packet  2) worksheet  Intervention 2:  List 10 situations, people, emotional responses that are unhealthy.  Develop a plan to avoid or manage them.     Intervention 3: Comply with the requirements of employer.                                         Given in Phase 2        05/24/17              Thru 09/2017 MP Effective                                               Completed  Completed    Completed              Completed 08/15/2017                                              08/15/2017  08/15/2017                  08/15/2017     Individual abuse prevention plan (required for lodging plus) : specific actions, referral:   No additional protection measures required other than the Program Abuse Prevention Plan - No     All interventions that are designated as  current  will need to be completed in order to transition out of treatment with a favorable prognosis.  The treatment plan is a flexible document and a work in progress.  Interventions and goals may be added at any time to customize plan to each individual s needs.  Client may work with counselor to change interventions as long as they pertain to the goals stipulated in the plan and/or are clinically driven.     HERRERA Ngo        "

## 2017-04-25 NOTE — TELEPHONE ENCOUNTER
4/25/2017    Spoke with Maylin about client returning to work. We decided he could return to work on 05/01/2017 and that she would contact his employer, which would then inform him. She said he would be monitored, needing to pass a drug screen test first and then it's up to him. I reported it would be better for him to return to work than sit around doing nothing like he is doing. She agreed. We ended the conversation.    HERRERA Ngo

## 2017-04-25 NOTE — PROGRESS NOTES
Acknowledgement of Current Treatment Plan       I have reviewed my treatment plan and safety plan with my therapist / counselor on 04/25/2017. I agree with the plan as it is written in the electronic health record.    Last date of use of alcohol reported as 04/10/2017    I understand that if I have two unexcused absences or more than four excused absences I can be discharged from IOP/OP group.     Name Signature   Jerad Alvarado    Name of Therapist / Counselor    HERRERA Ngo

## 2017-04-25 NOTE — TELEPHONE ENCOUNTER
4/25/2017    Maylin left a message for counselor about getting the ROIs that needed to be signed. She said she received them. She also was wondering about his return to work status and wanted to have a conversation about it. Maylin said she is leaving the company and a co-worker named Monique will take over the case.   She left her number for a call back.     HERRERA Ngo

## 2017-04-26 ENCOUNTER — HOSPITAL ENCOUNTER (OUTPATIENT)
Dept: BEHAVIORAL HEALTH | Facility: CLINIC | Age: 57
End: 2017-04-26
Attending: SOCIAL WORKER
Payer: COMMERCIAL

## 2017-04-26 PROCEDURE — H2035 A/D TX PROGRAM, PER HOUR: HCPCS | Mod: HQ

## 2017-04-27 NOTE — PROGRESS NOTES
CD ADULT Progress Note     Treatment Plan Review completed on:  04/27/2017    Attendance Dates: 04/24/2017, 04/25/2017, 04/26/2017  Total # of Group Sessions (including orientation):  4     MONDAY TUESDAY WEDNESDAY THURSDAY FRIDAY SATURDAY SUNDAY Total   Group Therapy 2  2     4 hours   Specialty Groups*  2      2 hours   1:1           Family Program           [place]             Phase II             Absent           Total 2 2 2     6 hours     *Specialty Groups include Mental Health Care, Assertiveness and Communication, Sobriety Maintenance Skills, Spiritual Care, Stress Management, Relapse Prevention, Family Systems.                    Learning Style:  Verbal  Demonstration    Staff member contributing:  HERRERA Ngo;  SABI Valencia, Intern     Received supervision:  No    Client:  contributed to goals and plan    Did Client receive a copy of treatment plan/revised plan:  Yes    Changes to Treatment Plan:  No    Client agrees with plan/revised plan:  Yes    Any changes in Vulnerable Adult Status:  No    Substance Use Disorders:  Alcohol Use Disorder Moderate (F10.20) and Tobacco Use Disorder Mild (Z72.0)      ASA Risk Ratings and Data       DIMENSION 1: Acute Intoxication/Withdrawal  The client's ability to cope with withdrawal symptoms and current state of intoxication       Acute Intoxication/Withdrawal - Current Risk Factor:  1    Reporting sober date of  04/10/17    Goals: Develop effective strategies to maintain sobriety.   Goals: Be able to handle mild to moderate withdrawal symptoms.    Data:  Client denies post acute withdrawal symptoms at this time. Client reports experiencing withdrawals for about 10 days after stopping alcohol use. Client reports experiencing sweats, unable to sleep, heart racing and diarrhea. No current concerns.     DIMENSION 2:  Biomedical Conditions and Complaints  The degree to which any physical disorder would interfere with treatment for substance abuse and the client's  ability to tolerate any related discomfort     Biomedical Conditions and Complaints - Current Risk Factor:  1    Goals: Follow recommendations of medical provider.     Data:  Client has a medical diagnosis of hypertension, high cholesterol and diabetes.  Client reports being prescribed medications for biomedical health symptoms and reports taking them as prescribed. Client is able to obtain medical services as needed. No other concerns.     DIMENSION 3:  Emotional/Behavioral/Cognitive Conditions and Complications  The degree to which any condition or complications are likely to interfere with treatment for substance abuse or with function in significant life areas and the likelihood of risk of harm to self or others.     Emotional/Behavioral - Current Risk Factor:  1    DSM-5 Diagnoses:   Reported by client none     Suicide Assessment:  Risk Status    Ideation - Active thoughts of suicide Intent to follow through on suicide Plan for completing suicide    Yes No Yes No Yes No   Emergent         Urgent / Non-Emergent         Non- Urgent         No Current/Active Risk   x  x  x     Goals: Improve self esteem.     Data:  Client denies mental health diagnosis. Client reports symptoms of disturbed sleep and difficulty concentrating. Client did not report how he is coping with symptoms at this time.  Client appears stable. Client denies thoughts of self harm. Client denies any past or current therapy.  No other concerns at this time.     DIMENSION 4:  Readiness to Change  Consider the amount of support and encouragement necessary to keep the client involved in treatment.     Readiness to Change - Current Risk Factor:  3    Goals:  Understand the impact your substance use has had on you, your family and significant relationships.  Goals: Increase Internal Motivation.    Data:  Client appears in the pre-contemplation stage of change. Client shared that he is here only because of work requirements and his wife concerns.  "Client is externally motivated. Client shared the values that were focused for moving his life forward this past week as: Family. Client appears reserved, engaging minimally. Client states that his first two weeks of not drinking was hard. Client reports his appetite is increasing now that he has not been drinking.      DIMENSION 5:  Relapse/Continued Use/Continued Problem Potential  Consider the degree to which the client recognizes relapse issues and has the skills to prevent relapse of either substance use or mental health problems.     Relapse/Continued Use/Continued Problem Potential - Current Risk Factor:  3    Goals:  Develop sober coping and living skills.  Goals: Identify personal triggers and relapse warning signs.    Data:  Client denies all cravings and triggers. Client did not report how he has been abstaining from drinking. Client appears to be minimizing his use. Client reports he had a drink every day for over a year and does not think he has a problem. Client reported a big family reunion in July coming up in his home town. Client shared that he feels this may be a challenge for him, as all his family drinks and celebrate during this time. Client reports since living in [place] he has been supported by his family, takes their concerns very seriously so he is doing treatment for them. Client was encouraged to think about his trip in July and how he would like to support his sobriety by being open about it with his family. Client states \" No need to do that, I will just not drink.\"       DIMENSION 6:  Recovery Environment  Consider the degree to which key areas of the client's life are supportive of or antagonistic to treatment participation and recovery.     Recovery Environment - Current Risk Factor:  2    Support group attended this week:  No    Did family agree to attend family week:  Pending    If yes:  none schedule this week    Goals: Develop sober support network.  Goals: Resolve issues with " "employer.  Goals: Reflect on treatment experience, celebrate accomplishments, anticipate challenges in new sober lifestyle and set goal.     Data:  Client is employed full time but is currently suspended due to coming into work intoxicated. Client reports this is first treatment experience. Client denies past/current legals. Client lives with wife at home. Client states he would drink at home, and never thought that by doing that he would end up here in treatment. Client has no sober support, outside of his immediate family. Client reports that he will build his sober support network in the coming week by \"Not drinking.\"         Intervention:  Client began treatment this week. Client listened to others present assignments and learned group norms this week.Client checked in with group rating his physical and mental health a 7 out of 10 (10 greatest). Client participated in viewing the movie \"Anonymous People\". Client participated in group discussion on addiction, recovery and sobriety. Client shared with group that this is his first time in treatment. Client shared what sobriety means to him is \"just no drinking.\"  Client shared his drinking never got out of hand but that he is in treatment for work requirement. Client shared minimal usage history, and was reserved and quiet in most of the group discussion.     Assessment:  Stages of Change Model  Precontemplation   Client appears to lack insight into his addiction. Client appears to be minimizing his use. Client appears reserved, participating when asked upon.       Plan:  Report to counselor/group any substance use  Comply with requirements from employer  Follow all recommendations by medical provider  Identify and Attend Sober Support Group  Practice self-affirmations, focusing on the 3 he picked from the packet.      HERRERA Ngo, Intern     "

## 2017-05-01 ENCOUNTER — HOSPITAL ENCOUNTER (OUTPATIENT)
Dept: BEHAVIORAL HEALTH | Facility: CLINIC | Age: 57
End: 2017-05-01
Attending: SOCIAL WORKER
Payer: COMMERCIAL

## 2017-05-01 PROCEDURE — H2035 A/D TX PROGRAM, PER HOUR: HCPCS | Mod: HQ

## 2017-05-02 ENCOUNTER — HOSPITAL ENCOUNTER (OUTPATIENT)
Dept: BEHAVIORAL HEALTH | Facility: CLINIC | Age: 57
End: 2017-05-02
Attending: SOCIAL WORKER
Payer: COMMERCIAL

## 2017-05-02 PROCEDURE — H2035 A/D TX PROGRAM, PER HOUR: HCPCS | Mod: HQ

## 2017-05-03 ENCOUNTER — HOSPITAL ENCOUNTER (OUTPATIENT)
Dept: BEHAVIORAL HEALTH | Facility: CLINIC | Age: 57
End: 2017-05-03
Attending: SOCIAL WORKER
Payer: COMMERCIAL

## 2017-05-03 PROCEDURE — H2035 A/D TX PROGRAM, PER HOUR: HCPCS | Mod: HQ

## 2017-05-04 NOTE — PROGRESS NOTES
CD ADULT Progress Note     Treatment Plan Review completed on:  05/04/17    Attendance Dates: 05/01, 05/02, 05/03  Total # of Group Sessions (including orientation):  7 MONDAY TUESDAY WEDNESDAY THURSDAY FRIDAY SATURDAY SUNDAY Total   Group Therapy 2  2     4 hours   Specialty Groups*  2      2 hours   1:1           Family Program           [place]             Phase II             Absent           Total 2 2 2     6 hours     *Specialty Groups include Mental Health Care, Assertiveness and Communication, Sobriety Maintenance Skills, Spiritual Care, Stress Management, Relapse Prevention, Family Systems.                    Learning Style:  Verbal  Demonstration    Staff member contributing:  Karo Dickinson [facility];  SABI Valencia, Intern     Received supervision:  No    Client:  contributed to goals and plan    Did Client receive a copy of treatment plan/revised plan:  Yes    Changes to Treatment Plan:  No    Client agrees with plan/revised plan:  Yes    Any changes in Vulnerable Adult Status:  No    Substance Use Disorders:  Alcohol Use Disorder Moderate (F10.20) and Tobacco Use Disorder Mild (Z72.0)      ASA Risk Ratings and Data       DIMENSION 1: Acute Intoxication/Withdrawal  The client's ability to cope with withdrawal symptoms and current state of intoxication       Acute Intoxication/Withdrawal - Current Risk Factor:  1    Reporting sober date of  04/10/17    Goals: Develop effective strategies to maintain sobriety.   Goals: Be able to handle mild to moderate withdrawal symptoms.    Data:  Client denies post acute withdrawal symptoms at this time.  No current concerns.     DIMENSION 2:  Biomedical Conditions and Complaints  The degree to which any physical disorder would interfere with treatment for substance abuse and the client's ability to tolerate any related discomfort     Biomedical Conditions and Complaints - Current Risk Factor:  1    Goals: Follow recommendations of medical provider.     Data:  Client  has a medical diagnosis of hypertension, high cholesterol and diabetes.  Client reports being prescribed medications for biomedical health symptoms and reports taking them as prescribed. Client is able to obtain medical services as needed. No other concerns.     DIMENSION 3:  Emotional/Behavioral/Cognitive Conditions and Complications  The degree to which any condition or complications are likely to interfere with treatment for substance abuse or with function in significant life areas and the likelihood of risk of harm to self or others.     Emotional/Behavioral - Current Risk Factor:  1    DSM-5 Diagnoses:   Reported by client none     Suicide Assessment:  Risk Status    Ideation - Active thoughts of suicide Intent to follow through on suicide Plan for completing suicide    Yes No Yes No Yes No   Emergent         Urgent / Non-Emergent         Non- Urgent         No Current/Active Risk   x  x  x     Goals: Improve self esteem.     Data:  Client denies mental health diagnosis. Client reports symptoms of fatigue and anxiety. Client did not report how he is coping with symptoms at this time.  Client appears stable. Client denies thoughts of self harm. Client denies any past or current therapy.  No other concerns at this time.     DIMENSION 4:  Readiness to Change  Consider the amount of support and encouragement necessary to keep the client involved in treatment.     Readiness to Change - Current Risk Factor:  3    Goals:  Understand the impact your substance use has had on you, your family and significant relationships.  Goals: Increase Internal Motivation.    Data:  Client appears to have moved into the contemplation stage of change. Client shared that he is here only because of work requirements and his wife concerns. Client is externally motivated. Client shared the values that were focused for moving his life forward this past week as: trying to get back on schedule for work. Client shared that he went to take  "his drug test for work requirements and went into work on Monday, but got sent home early. Client said his supervisor said they would call him and follow up. Client reports he think he still has a job. Client shared concerns of financial income decreasing due the weeks of no paychecks. Client shared his usage history to the group.        DIMENSION 5:  Relapse/Continued Use/Continued Problem Potential  Consider the degree to which the client recognizes relapse issues and has the skills to prevent relapse of either substance use or mental health problems.     Relapse/Continued Use/Continued Problem Potential - Current Risk Factor:  3    Goals:  Develop sober coping and living skills.  Goals: Identify personal triggers and relapse warning signs.    Data:  Client denies all cravings and triggers. Client reports he was able to see his grandchildren over the weekend, and plans to do so every weekend. Client shared his grandchildren was happy to see him and be around him. States he \"felt really good, loved understood and appreciated.\" Client reports he has not felt that way for a long time, due to being self isolated while drinking, which would cause him to drink even more from feeling lonely.      DIMENSION 6:  Recovery Environment  Consider the degree to which key areas of the client's life are supportive of or antagonistic to treatment participation and recovery.     Recovery Environment - Current Risk Factor:  2    Support group attended this week:  No    Did family agree to attend family week: Yes - wife will attend    If yes:  none schedule this week    Goals: Develop sober support network.  Goals: Resolve issues with employer.  Goals: Reflect on treatment experience, celebrate accomplishments, anticipate challenges in new sober lifestyle and set goal.     Data:  Client is employed full time but is currently suspended due to coming into work intoxicated. Client reports this is first treatment experience.  Client " "lives with wife at home. Client has no sober support, outside of his immediate family. Client reports that he will build his sober support network in the coming week by \"spending more time with family.\" Client reports that since he stop drinking his living situation has been helpful, \"we have a lot of fun.\"         Intervention:  Client checked in with group rating his physical and mental health a 10 (10 greatest). Client participated in group discussion on the difference in passive, aggressive, and assertive behaviors. Client shared he has always been assertive. Client reports he is known as quiet, but speaks up for himself and his respect at work.  Client participated in the 12 steps for Sobriety, Acceptance and Serenity assignments. Client shared his higher power is his wife. Client discussed how he never knew he would love someone so much or have someone who loves him just as much. Client reports his wife is very understanding, patient and supportive of his lifestyle change.  States he and his wife have \"been getting even closer since his sobriety, and that they recently sang a song together that he hasn't heard in years, that makes them both cry with peace and love every time.\"     Assessment:  Stages of Change Model  Precontemplation     Client appears to lack insight into his addiction. Client appears to be minimizing his use. Client appears reserved, participating when asked upon. Client has opened up a little more this week, but still does not quite share how much he was actually drinking. Client has shared how his drinking effected his family, and showed emotions of remorse, guilt and shame.       Plan:  Report to counselor/group any substance use  Comply with requirements from employer  Follow all recommendations by medical provider  Identify and Attend Sober Support Group  Practice assertiveness.  Present cravings and triggers next week.       Karo Dickinson, HERRERA Lowry, Intern     "

## 2017-05-08 ENCOUNTER — HOSPITAL ENCOUNTER (OUTPATIENT)
Dept: BEHAVIORAL HEALTH | Facility: CLINIC | Age: 57
End: 2017-05-08
Attending: SOCIAL WORKER
Payer: COMMERCIAL

## 2017-05-08 PROCEDURE — H2035 A/D TX PROGRAM, PER HOUR: HCPCS | Mod: HQ

## 2017-05-09 ENCOUNTER — HOSPITAL ENCOUNTER (OUTPATIENT)
Dept: BEHAVIORAL HEALTH | Facility: CLINIC | Age: 57
End: 2017-05-09
Attending: SOCIAL WORKER
Payer: COMMERCIAL

## 2017-05-09 PROCEDURE — H2035 A/D TX PROGRAM, PER HOUR: HCPCS | Mod: HQ

## 2017-05-10 ENCOUNTER — HOSPITAL ENCOUNTER (OUTPATIENT)
Dept: BEHAVIORAL HEALTH | Facility: CLINIC | Age: 57
End: 2017-05-10
Attending: SOCIAL WORKER
Payer: COMMERCIAL

## 2017-05-10 PROCEDURE — H2035 A/D TX PROGRAM, PER HOUR: HCPCS | Mod: HQ

## 2017-05-10 NOTE — PROGRESS NOTES
CD ADULT Progress Note     Treatment Plan Review completed on:  05/10/2017    Attendance Dates: 05/08/2017, 05/09/2017, 05/10/2017  Total # of Group Sessions (including orientation):  10     MONDAY TUESDAY WEDNESDAY THURSDAY FRIDAY SATURDAY [place] Total   Group Therapy 2  2     4 hours   Specialty Groups*  2      2 hours   1:1           Family Program           [place]             Phase II             Absent           Total 2 2 2     6 hours     *Specialty Groups include Mental Health Care, Assertiveness and Communication, Sobriety Maintenance Skills, Spiritual Care, Stress Management, Relapse Prevention, Family Systems.                    Learning Style:  Verbal  Demonstration    Staff member contributing:  HERRERA Ngo;  SABI Valencia, Intern     Received supervision:  No    Client:  contributed to goals and plan    Did Client receive a copy of treatment plan/revised plan:  Yes    Changes to Treatment Plan:  No    Client agrees with plan/revised plan:  Yes    Any changes in Vulnerable Adult Status:  No    Substance Use Disorders:  Alcohol Use Disorder Moderate (F10.20) and Tobacco Use Disorder Mild (Z72.0)      ASAM Risk Ratings and Data       DIMENSION 1: Acute Intoxication/Withdrawal  The client's ability to cope with withdrawal symptoms and current state of intoxication       Acute Intoxication/Withdrawal - Current Risk Factor:  1    Reporting sober date of  04/10/2017    Goals: Develop effective strategies to maintain sobriety.   Goals: Be able to handle mild to moderate withdrawal symptoms.    Data:  Client confirms sobriety. Client denies post acute withdrawal symptoms at this time.  No current concerns.     DIMENSION 2:  Biomedical Conditions and Complaints  The degree to which any physical disorder would interfere with treatment for substance abuse and the client's ability to tolerate any related discomfort     Biomedical Conditions and Complaints - Current Risk Factor:  1    Goals: Follow  recommendations of medical provider.     Data:  Client has a medical diagnosis of hypertension, high cholesterol and diabetes.  Client reports being prescribed medications for biomedical health symptoms and reports taking them as prescribed. Client is able to obtain medical services as needed. No other concerns.     DIMENSION 3:  Emotional/Behavioral/Cognitive Conditions and Complications  The degree to which any condition or complications are likely to interfere with treatment for substance abuse or with function in significant life areas and the likelihood of risk of harm to self or others.     Emotional/Behavioral - Current Risk Factor:  1    DSM-5 Diagnoses:   Reported by client none     Suicide Assessment:  Risk Status    Ideation - Active thoughts of suicide Intent to follow through on suicide Plan for completing suicide    Yes No Yes No Yes No   Emergent         Urgent / Non-Emergent         Non- Urgent         No Current/Active Risk   x  x  x     Goals: Improve self esteem.     Data:  Client denies mental health diagnosis. Client appears stable. Client denies thoughts of self harm. Client denies any mental health symptoms this week.  No other concerns at this time.     DIMENSION 4:  Readiness to Change  Consider the amount of support and encouragement necessary to keep the client involved in treatment.     Readiness to Change - Current Risk Factor:  3    Goals:  Understand the impact your substance use has had on you, your family and significant relationships.  Goals: Increase Internal Motivation.    Data:  Client appears to have moved into the contemplation stage of change. Client initially shared that he is here only because of work requirements and his wife concerns. Client appears to be gaining more internal motivation as treatment continues. Client shared the values that were focused for moving his life forward this past week as: family. Client shared he was able to return to work on Monday, and he is  "happy to be back at work.      DIMENSION 5:  Relapse/Continued Use/Continued Problem Potential  Consider the degree to which the client recognizes relapse issues and has the skills to prevent relapse of either substance use or mental health problems.     Relapse/Continued Use/Continued Problem Potential - Current Risk Factor:  3    Goals:   1. Develop sober coping and living skills.  2. Identify personal triggers and relapse warning signs.    Data:  Client denies all cravings and triggers. Client reports he was able to see his grandchildren over the weekend, and plans to do so every weekend. Client reports he's staying away from alcohol at this point. No other concerns.     DIMENSION 6:  Recovery Environment  Consider the degree to which key areas of the client's life are supportive of or antagonistic to treatment participation and recovery.     Recovery Environment - Current Risk Factor:  2    Support group attended this week:  No    Did family agree to attend family week: Yes - wife will attend    If yes:  none schedule this week    Goals:   1. Develop sober support network.  2. Resolve issues with employer.  3. Reflect on treatment experience, celebrate accomplishments, anticipate challenges in new sober lifestyle and set goal.     Data:  Client is employed full time and has returned to work.  Client lives with wife at home. Client has no sober support, outside of his immediate family. Client reports that he will build his sober support network in the coming week by \"staying away from alcohol.\" Client reports that since he stop drinking his living situation has been helpful, \"we have a lot more fun.\"         Intervention:  Client checked in with group rating his physical and mental health a 10 out of 10 (10 greatest). Client participated in group assignment of Denying Feelings. Client shared that the feeling he struggles most with are the feelings of shame about past use, self isolating and stress his use caused " "on his family. Client also reported sadness of not having the strongest relationship with his children, but states he is working on rebuilding relationship. Client shared fear of losing wife if he were to go back to using. Client states wife stuck with him all this time, and so he is willing to do anything to keep his \"soulmate.\" Client participated in spiritual care group with Billie. Client listened to others present assignments.     Assessment:  Stages of Change Model  Contemplation     Client appears to have gained some insight into his addition.Client has shared how his drinking effected his family and showed emotions of remorse, guilt and shame.       Plan:    Report to counselor/group any substance use  Comply with requirements from employer  Follow all recommendations by medical provider  Identify and Attend Sober Support Groups  Attend family group next week with wife.        Karo Dickinson, HERRERA Lowry, Intern     "

## 2017-05-15 ENCOUNTER — HOSPITAL ENCOUNTER (OUTPATIENT)
Dept: BEHAVIORAL HEALTH | Facility: CLINIC | Age: 57
End: 2017-05-15
Attending: SOCIAL WORKER
Payer: COMMERCIAL

## 2017-05-15 PROCEDURE — H2035 A/D TX PROGRAM, PER HOUR: HCPCS | Mod: HQ

## 2017-05-16 ENCOUNTER — HOSPITAL ENCOUNTER (OUTPATIENT)
Dept: BEHAVIORAL HEALTH | Facility: CLINIC | Age: 57
End: 2017-05-16
Attending: SOCIAL WORKER
Payer: COMMERCIAL

## 2017-05-16 PROCEDURE — H2035 A/D TX PROGRAM, PER HOUR: HCPCS | Mod: HQ

## 2017-05-17 ENCOUNTER — HOSPITAL ENCOUNTER (OUTPATIENT)
Dept: BEHAVIORAL HEALTH | Facility: CLINIC | Age: 57
End: 2017-05-17
Attending: SOCIAL WORKER
Payer: COMMERCIAL

## 2017-05-17 PROCEDURE — H2035 A/D TX PROGRAM, PER HOUR: HCPCS | Mod: HQ

## 2017-05-18 NOTE — PROGRESS NOTES
CD ADULT Progress Note     Treatment Plan Review completed on:  05/17/2017    Attendance Dates: 05/15/2017, 05/16/2017, 05/17/2017 (family groups 5/15 and 5/17)  Total # of Group Sessions (including orientation):  13 MONDAY TUESDAY WEDNESDAY THURSDAY FRIDAY SATURDAY SUNDAY Total   Group Therapy         hours   Specialty Groups*  2      2 hours   1:1           Family Program 2  2     4   [place]             Phase II             Absent           Total 2 2 2     6 hours     *Specialty Groups include Mental Health Care, Assertiveness and Communication, Sobriety Maintenance Skills, Spiritual Care, Stress Management, Relapse Prevention, Family Systems.                    Learning Style:  Verbal  Demonstration    Staff member contributing:  Karo Dickinson, [facility];  SABI Valencia, Intern ;SIDRA Thorpe MA,ROBERTO,[facility]    Received supervision:  No    Client:  contributed to goals and plan/client attended both scheduled family sessions-dimension 6    Did Client receive a copy of treatment plan/revised plan:  Yes    Changes to Treatment Plan:  No    Client agrees with plan/revised plan:  Yes    Any changes in Vulnerable Adult Status:  No    Substance Use Disorders:  Alcohol Use Disorder Moderate (F10.20) and Tobacco Use Disorder Mild (Z72.0)      [facility] Risk Ratings and Data       DIMENSION 1: Acute Intoxication/Withdrawal  The client's ability to cope with withdrawal symptoms and current state of intoxication       Acute Intoxication/Withdrawal - Current Risk Factor:  1    Reporting sober date of  04/10/2017    Goals: Develop effective strategies to maintain sobriety.   Goals: Be able to handle mild to moderate withdrawal symptoms.    Data:  Client confirms sobriety. Client denies post acute withdrawal symptoms at this time.  No current concerns.   Week of 5/15 family group- client did not document his RONNIE date on check-in sheet this week. There were no WD or use issues observed by counselor or reported by client or family  member.  DIMENSION 2:  Biomedical Conditions and Complaints  The degree to which any physical disorder would interfere with treatment for substance abuse and the client's ability to tolerate any related discomfort     Biomedical Conditions and Complaints - Current Risk Factor:  1    Goals: Follow recommendations of medical provider.     Data:  Client has a medical diagnosis of hypertension, high cholesterol and diabetes.  Client reports being prescribed medications for biomedical health symptoms and reports taking them as prescribed. Client is able to obtain medical services as needed. No other concerns.   Week of 5/15 family group-client denies any physical/biomedical conditions or concerns this week.  DIMENSION 3:  Emotional/Behavioral/Cognitive Conditions and Complications  The degree to which any condition or complications are likely to interfere with treatment for substance abuse or with function in significant life areas and the likelihood of risk of harm to self or others.     Emotional/Behavioral - Current Risk Factor:  1    DSM-5 Diagnoses:   Reported by client none     Suicide Assessment:  Risk Status    Ideation - Active thoughts of suicide Intent to follow through on suicide Plan for completing suicide    Yes No Yes No Yes No   Emergent         Urgent / Non-Emergent         Non- Urgent         No Current/Active Risk   x  x  x     Goals: Improve self esteem.     Data:  Client denies mental health diagnosis. Client appears stable. Client denies thoughts of self harm. Client denies any mental health symptoms this week.  No other concerns at this time.   Week of 5/15 family group- client denies any thoughts of self-harm recently. Denies any emotional/cognitive/behavioral conditions this week.  DIMENSION 4:  Readiness to Change  Consider the amount of support and encouragement necessary to keep the client involved in treatment.     Readiness to Change - Current Risk Factor:  3    Goals:  Understand the impact  your substance use has had on you, your family and significant relationships.  Goals: Increase Internal Motivation.    Data:  Client appears to have moved into the contemplation stage of change. Client initially shared that he is here only because of work requirements and his wife concerns. Client appears to be gaining more internal motivation as treatment continues. Client shared the values that were focused for moving his life forward this past week as: family. Client shared he was able to return to work on Monday, and he is happy to be back at work.    Week of 5/15 family group-clients partner feels he is doing well at this time/they are talking more and going for walks and she feels he is participating with family more.  DIMENSION 5:  Relapse/Continued Use/Continued Problem Potential  Consider the degree to which the client recognizes relapse issues and has the skills to prevent relapse of either substance use or mental health problems.     Relapse/Continued Use/Continued Problem Potential - Current Risk Factor:  3    Goals:   1. Develop sober coping and living skills.  2. Identify personal triggers and relapse warning signs.    Data:  Client denies all cravings and triggers. Client reports he was able to see his grandchildren over the weekend, and plans to do so every weekend. Client reports he's staying away from alcohol at this point. No other concerns.   Week of 5/15 family group-client reports cravings a 1 on 1-10 scale with 10 highest this week.  DIMENSION 6:  Recovery Environment  Consider the degree to which key areas of the client's life are supportive of or antagonistic to treatment participation and recovery.     Recovery Environment - Current Risk Factor:  2    Support group attended this week:  No    Did family agree to attend family week: Yes - wife will attend    If yes:  none schedule this week    Goals:   1. Develop sober support network.  2. Resolve issues with employer.  3. Reflect on  "treatment experience, celebrate accomplishments, anticipate challenges in new sober lifestyle and set goal.     Data:  Client is employed full time and has returned to work.  Client lives with wife at home. Client has no sober support, outside of his immediate family. Client reports that he will build his sober support network in the coming week by \"staying away from alcohol.\" Client reports that since he stop drinking his living situation has been helpful, \"we have a lot more fun.\"  Week of 5/15 family group- clients partner feels he is doing well, they are talking more, going for walks and she feels he is participating more in family life. Client feels he would like to spend more time with family this coming week. Partner appears quite supportive of clients recovery as evidenced by her attendance at family group and her participation in the sessions. Spouse does report concern over upcoming trip client has to visit his family where there will be etoh- client encouraged to work out a plan with his /group to deal with this issue.       Intervention:  Client checked in with group rating his physical and mental health a 10 out of 10 (10 greatest). Client participated in group assignment of Denying Feelings. Client shared that the feeling he struggles most with are the feelings of shame about past use, self isolating and stress his use caused on his family. Client also reported sadness of not having the strongest relationship with his children, but states he is working on rebuilding relationship. Client shared fear of losing wife if he were to go back to using. Client states wife stuck with him all this time, and so he is willing to do anything to keep his \"soulmate.\" Client participated in spiritual care group with Billie. Client listened to others present assignments.   Week of 5/15 family group- client gave overview of why he is in treatment. Spouse feels he is doing well and talking more and participating " more with family. Spouse is concerned regarding trip client has to visit his family because of etoh use. Client encouraged to come up with a plan to deal with this issue with his  and group. Clients spouse also discussed how she quit etoh about 25 years ago and client kept using. She stated during his use he would be in the basement at home and she would be in another area of the house/she also stated that it was hard for her to come home from work because client would be using. She then discussed how things have vastly improved since he stopped his use.  Assessment:  Stages of Change Model  Contemplation     Client appears to have gained some insight into his addition.Client has shared how his drinking effected his family and showed emotions of remorse, guilt and shame.   Week of 5/15 family group- client appears to be in contemplation- does not appear to understand the implications of being around people who use etoh.    Plan:    Report to counselor/group any substance use  Comply with requirements from employer  Follow all recommendations by medical provider  Identify and Attend Sober Support Groups  Attend family group next week with wife.    Week of 5/15 family group- continue with above plan minus the family group attendance/discuss with his group and  a plan for dealing with upcoming trip.Family to be supportive and consider a support system-referral given.    Dung Thorpe MA,ROBERTO,[facility]

## 2017-05-22 ENCOUNTER — HOSPITAL ENCOUNTER (OUTPATIENT)
Dept: BEHAVIORAL HEALTH | Facility: CLINIC | Age: 57
End: 2017-05-22
Attending: SOCIAL WORKER
Payer: COMMERCIAL

## 2017-05-22 PROCEDURE — H2035 A/D TX PROGRAM, PER HOUR: HCPCS | Mod: HQ

## 2017-05-22 NOTE — PROGRESS NOTES
[address]  [address] [facility]  [place] [address]      5/22/2017      Jeradpavan Alvarado  1960        To Whom It May Concern:    This is to verify that the above named client participated in a program for chemical dependency at [facility] at the location above.    Client had a chemical dependency assessment through [facility] on 04/10/2017.    The individual was admitted on 04/12/2017.    Currently enrolled in treatment with a tentative discharge date of 09/12/2017.    This individual participated in the following program(s):  the Outpatient Program    Additional comments:   Client is currently in our IOP treatment, attending 3 days a week. Client has been enrolled since 04/12/2017 and has not missed a session. Client attends group therapy for 2 hours each day. Client will transition to 1 day week sometime in the first two weeks of June.     If there are further questions, I can be reached at [phone]          HERRERA Ngo

## 2017-05-23 ENCOUNTER — HOSPITAL ENCOUNTER (OUTPATIENT)
Dept: BEHAVIORAL HEALTH | Facility: CLINIC | Age: 57
End: 2017-05-23
Attending: SOCIAL WORKER
Payer: COMMERCIAL

## 2017-05-23 PROCEDURE — H2035 A/D TX PROGRAM, PER HOUR: HCPCS | Mod: HQ

## 2017-05-24 ENCOUNTER — HOSPITAL ENCOUNTER (OUTPATIENT)
Dept: BEHAVIORAL HEALTH | Facility: CLINIC | Age: 57
End: 2017-05-24
Attending: SOCIAL WORKER
Payer: COMMERCIAL

## 2017-05-24 PROCEDURE — H2035 A/D TX PROGRAM, PER HOUR: HCPCS | Mod: HQ

## 2017-05-25 NOTE — PROGRESS NOTES
CD ADULT Progress Note     Treatment Plan Review completed on:  05/25/2017    Attendance Dates: 05/22/2017, 05/23/2017, 05/24/2017  Total # of Group Sessions (including orientation):  16 MONDAY TUESDAY WEDNESDAY THURSDAY FRIDAY SATURDAY SUNDAY Total   Group Therapy 2  2     4 hours   Specialty Groups*  2      2 hours   1:1           Family Program           [place]             Phase II             Absent           Total 2 2 2     6 hours     *Specialty Groups include Mental Health Care, Assertiveness and Communication, Sobriety Maintenance Skills, Spiritual Care, Stress Management, Relapse Prevention, Family Systems.                    Learning Style:  Verbal  Demonstration    Staff member contributing:  HERRERA Ngo     Received supervision:  No    Client:  contributed to goals and plan    Did Client receive a copy of treatment plan/revised plan:  Yes    Changes to Treatment Plan:  No    Client agrees with plan/revised plan:  Yes    Any changes in Vulnerable Adult Status:  No    Substance Use Disorders:  Alcohol Use Disorder Moderate (F10.20) and Tobacco Use Disorder Mild (Z72.0)      [facility] Risk Ratings and Data       DIMENSION 1: Acute Intoxication/Withdrawal  The client's ability to cope with withdrawal symptoms and current state of intoxication       Acute Intoxication/Withdrawal - Current Risk Factor:  1    Reporting sober date of  04/10/2017    Goals: Develop effective strategies to maintain sobriety.   Goals: Be able to handle mild to moderate withdrawal symptoms.    Data:  Client confirms sobriety. Client denies post acute withdrawal symptoms at this time.  No current concerns.     DIMENSION 2:  Biomedical Conditions and Complaints  The degree to which any physical disorder would interfere with treatment for substance abuse and the client's ability to tolerate any related discomfort     Biomedical Conditions and Complaints - Current Risk Factor:  1    Goals: Follow recommendations of medical  provider.     Data:  Client has a medical diagnosis of hypertension, high cholesterol and diabetes.  Client reports being prescribed medications for biomedical health symptoms and reports taking them as prescribed. Client is able to obtain medical services as needed. No other concerns.     DIMENSION 3:  Emotional/Behavioral/Cognitive Conditions and Complications  The degree to which any condition or complications are likely to interfere with treatment for substance abuse or with function in significant life areas and the likelihood of risk of harm to self or others.     Emotional/Behavioral - Current Risk Factor:  1    DSM-5 Diagnoses:   Reported by client none     Suicide Assessment:  Risk Status    Ideation - Active thoughts of suicide Intent to follow through on suicide Plan for completing suicide    Yes No Yes No Yes No   Emergent         Urgent / Non-Emergent         Non- Urgent         No Current/Active Risk   x  x  x     Goals: Improve self esteem.     Data:  Client denies mental health diagnosis. Client appears stable. Client denies thoughts of self harm. Client denies experiencing any mental health symptoms. No other concerns at this time.     DIMENSION 4:  Readiness to Change  Consider the amount of support and encouragement necessary to keep the client involved in treatment.     Readiness to Change - Current Risk Factor:  1    Goals:  Understand the impact your substance use has had on you, your family and significant relationships.  Goals: Increase Internal Motivation.    Data:  Client has moved into the preparation stage of change. Client is motivated for recovery, is spending time with his family and is always in a positive mood. Client reports family week was eye opening and happy his wife was involved. Client appears internally motivated for recovery.      DIMENSION 5:  Relapse/Continued Use/Continued Problem Potential  Consider the degree to which the client recognizes relapse issues and has the  "skills to prevent relapse of either substance use or mental health problems.     Relapse/Continued Use/Continued Problem Potential - Current Risk Factor:  2    Goals:   1. Develop sober coping and living skills.  2. Identify personal triggers and relapse warning signs.    Data:  Client denies all cravings and triggers this week.    DIMENSION 6:  Recovery Environment  Consider the degree to which key areas of the client's life are supportive of or antagonistic to treatment participation and recovery.     Recovery Environment - Current Risk Factor:  2    Support group attended this week:  No    Did family agree to attend family week: Yes - wife will attend    If yes:  none schedule this week - attended with wife    Goals:   1. Develop sober support network.  2. Resolve issues with employer.  3. Reflect on treatment experience, celebrate accomplishments, anticipate challenges in new sober lifestyle and set goal.     Data:  Client is employed full time and has returned to work.  Client lives with wife at home. Client has no sober support, outside of his immediate family. Client reports that he will build his sober support network in the coming week by \"staying away from alcohol.\" Client reports that since he stop drinking his living situation has been helpful, \"we have a lot more fun.\" Client reports no problems in his home life.          Intervention:  Client checked in with group rating his physical and mental health a 10 out of 10 (10 greatest). Client participated in group activity about signs of relapse. Client participated in skills group - stress management. Client discussed having less stress since he's not drinking. Client discussed celebrating his birthday over the weekend with his family and couldn't be happier.     Assessment:  Stages of Change Model  Contemplation  Preparation/Determination   Client has been in a great mood. Client appears internally motivated for recovery.       Plan:    Report to " counselor/group any substance use  Comply with requirements from employer  Follow all recommendations by medical provider  Identify and Attend Sober Support Groups       HERRERA Ngo

## 2017-05-30 ENCOUNTER — HOSPITAL ENCOUNTER (OUTPATIENT)
Dept: BEHAVIORAL HEALTH | Facility: CLINIC | Age: 57
End: 2017-05-30
Attending: SOCIAL WORKER
Payer: COMMERCIAL

## 2017-05-30 PROCEDURE — H2035 A/D TX PROGRAM, PER HOUR: HCPCS | Mod: HQ

## 2017-05-31 ENCOUNTER — HOSPITAL ENCOUNTER (OUTPATIENT)
Dept: BEHAVIORAL HEALTH | Facility: CLINIC | Age: 57
End: 2017-05-31
Attending: SOCIAL WORKER
Payer: COMMERCIAL

## 2017-05-31 PROCEDURE — H2035 A/D TX PROGRAM, PER HOUR: HCPCS | Mod: HQ

## 2017-06-01 NOTE — PROGRESS NOTES
CD ADULT Progress Note     Treatment Plan Review completed on:  05/31/2017    Attendance Dates:  05/30/2017, 05/31/2017 (excused 05/29 - holiday)  Total # of Group Sessions (including orientation):  16 MONDAY TUESDAY WEDNESDAY THURSDAY FRIDAY SATURDAY SUNDAY Total   Group Therapy   2     2 hours   Specialty Groups*  2      2 hours   1:1           Family Program           [place]             Phase II             Absent           Total  2 2     4 hours     *Specialty Groups include Mental Health Care, Assertiveness and Communication, Sobriety Maintenance Skills, Spiritual Care, Stress Management, Relapse Prevention, Family Systems.                    Learning Style:  Verbal  Demonstration    Staff member contributing:  HERRERA Ngo     Received supervision:  No    Client:  contributed to goals and plan    Did Client receive a copy of treatment plan/revised plan:  Yes    Changes to Treatment Plan:  No    Client agrees with plan/revised plan:  Yes    Any changes in Vulnerable Adult Status:  No    Substance Use Disorders:  Alcohol Use Disorder Moderate (F10.20) and Tobacco Use Disorder Mild (Z72.0)      [facility] Risk Ratings and Data       DIMENSION 1: Acute Intoxication/Withdrawal  The client's ability to cope with withdrawal symptoms and current state of intoxication       Acute Intoxication/Withdrawal - Current Risk Factor:  1    Reporting sober date of  04/10/2017    Goals: Develop effective strategies to maintain sobriety.   Goals: Be able to handle mild to moderate withdrawal symptoms.    Data:  Client confirms sobriety. Client denies post acute withdrawal symptoms at this time.  No current concerns.     DIMENSION 2:  Biomedical Conditions and Complaints  The degree to which any physical disorder would interfere with treatment for substance abuse and the client's ability to tolerate any related discomfort     Biomedical Conditions and Complaints - Current Risk Factor:  1    Goals: Follow recommendations of  "medical provider.     Data:  Client has a medical diagnosis of hypertension, high cholesterol and diabetes.  Client reports being prescribed medications for biomedical health symptoms and reports taking them as prescribed. Client is able to obtain medical services as needed. No other concerns.     DIMENSION 3:  Emotional/Behavioral/Cognitive Conditions and Complications  The degree to which any condition or complications are likely to interfere with treatment for substance abuse or with function in significant life areas and the likelihood of risk of harm to self or others.     Emotional/Behavioral - Current Risk Factor:  1    DSM-5 Diagnoses:   Reported by client none     Suicide Assessment:  Risk Status    Ideation - Active thoughts of suicide Intent to follow through on suicide Plan for completing suicide    Yes No Yes No Yes No   Emergent         Urgent / Non-Emergent         Non- Urgent         No Current/Active Risk   x  x  x     Goals: Improve self esteem.     Data:  Client denies mental health diagnosis. Client appears stable. Client denies thoughts of self harm. Client denies experiencing any mental health symptoms. Client reports \"life is good.\" No other concerns at this time.     DIMENSION 4:  Readiness to Change  Consider the amount of support and encouragement necessary to keep the client involved in treatment.     Readiness to Change - Current Risk Factor:  1    Goals: Understand the impact your substance use has had on you, your family and significant relationships.  Goals: Increase Internal Motivation.    Data:  Client has moved into the preparation stage of change. Client is motivated for recovery, is spending time with his family and is always in a positive mood. Client reports this week he worked on \"spending more time with family.\" Client appears internally motivated for recovery.      DIMENSION 5:  Relapse/Continued Use/Continued Problem Potential  Consider the degree to which the client " "recognizes relapse issues and has the skills to prevent relapse of either substance use or mental health problems.     Relapse/Continued Use/Continued Problem Potential - Current Risk Factor:  2    Goals:   1. Develop sober coping and living skills.  2. Identify personal triggers and relapse warning signs.    Data:  Client denies all cravings and triggers this week.    DIMENSION 6:  Recovery Environment  Consider the degree to which key areas of the client's life are supportive of or antagonistic to treatment participation and recovery.     Recovery Environment - Current Risk Factor:  2    Support group attended this week:  No    Did family agree to attend family week: Yes - wife will attend    If yes:  none schedule this week - attended with wife    Goals:   1. Develop sober support network.  2. Resolve issues with employer.  3. Reflect on treatment experience, celebrate accomplishments, anticipate challenges in new sober lifestyle and set goal.     Data:  Client is employed full time and has returned to work.  Client lives with wife at home. Client has no sober support, outside of his immediate family. Client reports that he will build his sober support network in the coming week by \"hanging out with family.\" Client reports that since he stop drinking his living situation has been helpful, \"we have a lot more fun.\" Client reports no problems in his home life.          Intervention:  Client checked in with group rating his physical and mental health a 9.5 out of 10 (10 greatest). Client participated in skills group, communication with family. Client discussed how he asked his wife how things were going in their relationship and he was surprised he brought up some stuff from the past when he was drinking. Client reports at first he was defensive, but then just listened and they resolved the issues. Client reports he is going to continue trying to make up for the lost time with his family he missed out on when " drinking.     Assessment:  Stages of Change Model  Contemplation  Preparation/Determination   Client has been in a great mood. Client appears internally motivated for recovery. Client continues to open up, ask others questions and share insight that he's learned. Client has really changed since he began treatment. Client looks better physically as well.       Plan:    Report to counselor/group any substance use  Comply with requirements from employer  Follow all recommendations by medical provider  Identify and Attend Sober Support Groups  Spend one more week in Phase 1, then move into Phase 2.       HERRERA Ngo

## 2017-06-05 ENCOUNTER — HOSPITAL ENCOUNTER (OUTPATIENT)
Dept: BEHAVIORAL HEALTH | Facility: CLINIC | Age: 57
End: 2017-06-05
Attending: SOCIAL WORKER
Payer: COMMERCIAL

## 2017-06-05 PROCEDURE — H2035 A/D TX PROGRAM, PER HOUR: HCPCS | Mod: HQ

## 2017-06-05 NOTE — TELEPHONE ENCOUNTER
6/5/2017    Spoke with Monique and said he attended all last week and has two days this week in IOP then will transition to Phase 2 next week. I said he will come in one day week on Tuesdays. I said he has become more vocal, looks much better and appears to be doing well. She said that's great, she will report that on and will be in contact next week.    HERRERA Ngo

## 2017-06-05 NOTE — TELEPHONE ENCOUNTER
On 06/01/2017  Monique left a message wondering about his status in IOP. Left her number for a call back.    HERRERA Ngo

## 2017-06-06 ENCOUNTER — HOSPITAL ENCOUNTER (OUTPATIENT)
Dept: BEHAVIORAL HEALTH | Facility: CLINIC | Age: 57
End: 2017-06-06
Attending: SOCIAL WORKER
Payer: COMMERCIAL

## 2017-06-06 PROCEDURE — H2035 A/D TX PROGRAM, PER HOUR: HCPCS | Mod: HQ

## 2017-06-07 NOTE — TELEPHONE ENCOUNTER
----- Message from Karo Dickinson sent at 6/6/2017  7:08 PM CDT -----  Regarding: Phase 2  Please schedule this client for Phase 2 [place] [id number] starting 06/13/2017 for 12 sessions.  Specifically for Tuesday's at 7:30pm.    Thank you,  Karo

## 2017-06-07 NOTE — PROGRESS NOTES
CD ADULT Progress Note     Treatment Plan Review completed on: 06/07/2017    Attendance Dates:  06/05/2017, 06/06/2017, 06/07/2017    Total # of Group Sessions (including orientation):  20 MONDAY TUESDAY WEDNESDAY THURSDAY FRIDAY SATURDAY SUNDAY Total   Group Therapy 2       2 hours   Specialty Groups*  2      2 hours   1:1           Family Program           [place]             Phase II             Absent           Total 2 2      4 hours     *Specialty Groups include Mental Health Care, Assertiveness and Communication, Sobriety Maintenance Skills, Spiritual Care, Stress Management, Relapse Prevention, Family Systems.                    Learning Style:  Verbal  Demonstration    Staff member contributing:  HERRERA Ngo;  SABI Valencia, Intern     Received supervision:  No    Client:  contributed to goals and plan    Did Client receive a copy of treatment plan/revised plan:  Yes    Changes to Treatment Plan:  No    Client agrees with plan/revised plan:  Yes    Any changes in Vulnerable Adult Status:  No    Substance Use Disorders:  Alcohol Use Disorder Moderate (F10.20) and Tobacco Use Disorder Mild (Z72.0)      [facility] Risk Ratings and Data       DIMENSION 1: Acute Intoxication/Withdrawal  The client's ability to cope with withdrawal symptoms and current state of intoxication       Acute Intoxication/Withdrawal - Current Risk Factor:  1    Reporting sober date of  04/10/2017    Goals: Develop effective strategies to maintain sobriety.   Goals: Be able to handle mild to moderate withdrawal symptoms.    Data:  Client confirms sobriety. Client denies post acute withdrawal symptoms at this time.  No current concerns.     DIMENSION 2:  Biomedical Conditions and Complaints  The degree to which any physical disorder would interfere with treatment for substance abuse and the client's ability to tolerate any related discomfort     Biomedical Conditions and Complaints - Current Risk Factor:  1    Goals: Follow  "recommendations of medical provider.     Data:  Client has a medical diagnosis of hypertension, high cholesterol and diabetes.  Client reports being prescribed medications for biomedical health symptoms and reports taking them as prescribed. Client is able to obtain medical services as needed. No concerns this week.     DIMENSION 3:  Emotional/Behavioral/Cognitive Conditions and Complications  The degree to which any condition or complications are likely to interfere with treatment for substance abuse or with function in significant life areas and the likelihood of risk of harm to self or others.     Emotional/Behavioral - Current Risk Factor:  0    DSM-5 Diagnoses:   Reported by client none     Suicide Assessment:  Risk Status    Ideation - Active thoughts of suicide Intent to follow through on suicide Plan for completing suicide    Yes No Yes No Yes No   Emergent         Urgent / Non-Emergent         Non- Urgent         No Current/Active Risk   x  x  x     Goals: Improve self esteem.     Data:  Client denies mental health diagnosis. Client appears stable. Client denies thoughts of self harm. Client denies experiencing any mental health symptoms. No other concerns at this time. Client has been in a good mood throughout treatment and many times a \"10 out of 10\".    DIMENSION 4:  Readiness to Change  Consider the amount of support and encouragement necessary to keep the client involved in treatment.     Readiness to Change - Current Risk Factor:  1    Goals: Understand the impact your substance use has had on you, your family and significant relationships.  Goals: Increase Internal Motivation.    Data:  Client has moved into the preparation stage of change. Client is motivated for recovery, is spending time with his family and is always in a positive mood. Client reports this week he worked on \"spending time with family.\" Client appears internally motivated for recovery. Client appears to be making up for \"lost time " "while drinking\" with his family.     DIMENSION 5:  Relapse/Continued Use/Continued Problem Potential  Consider the degree to which the client recognizes relapse issues and has the skills to prevent relapse of either substance use or mental health problems.     Relapse/Continued Use/Continued Problem Potential - Current Risk Factor:  2    Goals:   1. Develop sober coping and living skills.  2. Identify personal triggers and relapse warning signs.    Data:  Client denies all cravings and triggers this week.     DIMENSION 6:  Recovery Environment  Consider the degree to which key areas of the client's life are supportive of or antagonistic to treatment participation and recovery.     Recovery Environment - Current Risk Factor:  1    Support group attended this week:  No    Did family agree to attend family week: Yes - wife will attend    If yes:  none schedule this week - attended with wife    Goals:   1. Develop sober support network.  2. Resolve issues with employer.  3. Reflect on treatment experience, celebrate accomplishments, anticipate challenges in new sober lifestyle and set goal.     Data:  Client is employed full time.  Client lives with his wife at home. Client has no sober support, outside of his immediate family. Client reports that he will build his sober support network in the coming week by \"talking more with my brother.\" Client reports that since he stop drinking his living situation has been helpful, \"we have a lot more time to do things together. I'm happy to be present and able to be involved.\" Client reports no problems in his home life.          Intervention:  Client checked in with group rating his physical and mental health a 10 out of 10 (10 greatest). Client participated in skills group: Relapse Prevention. Client discussed how he is taking more time for self care, as his self care involves \"being supportive to my wife in whatever she asks me to do, because she loves having me around her and " "being productive.\" Client reports group was helpful this week as: learning more about myself and getting better in my recovery.    Assessment:  Stages of Change Model  Contemplation  Preparation/Determination     Client has been in a great mood during group sessions. Client appears internally motivated for recovery. Client continues to open up, ask others questions and share insight that he's learned. Client has really changed for the better since he began treatment. Client looks better physically as well.       Plan:    Report to counselor/group any substance use.  Comply with requirements from employer.  Follow all recommendations by medical provider.  Identify and Attend Sober Support Groups.  Begin Phase 2 next week 06/13/2017.       HERRERA Ngo, Intern       "

## 2017-06-12 ENCOUNTER — TELEPHONE (OUTPATIENT)
Dept: BEHAVIORAL HEALTH | Facility: CLINIC | Age: 57
End: 2017-06-12

## 2017-06-13 ENCOUNTER — HOSPITAL ENCOUNTER (OUTPATIENT)
Dept: BEHAVIORAL HEALTH | Facility: CLINIC | Age: 57
End: 2017-06-13
Attending: SOCIAL WORKER
Payer: COMMERCIAL

## 2017-06-13 PROCEDURE — H2035 A/D TX PROGRAM, PER HOUR: HCPCS | Mod: HQ

## 2017-06-14 NOTE — PROGRESS NOTES
CD ADULT Progress Note     Treatment Plan Review completed on: 06/14/2017    Attendance Dates:  06/13/2017    Total # of Group Phase 2 Sessions this week:  1  Total # of Phase 2 group sessions to date: 1 MONDAY TUESDAY WEDNESDAY THURSDAY FRIDAY SATURDAY SUNDAY Total   Group Therapy           Specialty Groups*           1:1           Family Program           [place]             Phase II    1.5 hours       1.5 hours   Absent           Total  1.5 hours       1.5 hours      *Specialty Groups include Mental Health Care, Assertiveness and Communication, Sobriety Maintenance Skills, Spiritual Care, Stress Management, Relapse Prevention, Family Systems.                    Learning Style:  Verbal  Demonstration    Staff member contributing:  HERRERA Ngo;  SABI Valencia, Intern     Received supervision:  No    Client:  contributed to goals and plan    Did Client receive a copy of treatment plan/revised plan:  Yes    Changes to Treatment Plan:  No    Client agrees with plan/revised plan:  Yes    Any changes in Vulnerable Adult Status:  No    Substance Use Disorders:  Alcohol Use Disorder Moderate (F10.20) and Tobacco Use Disorder Mild (Z72.0)      [facility] Risk Ratings and Data       DIMENSION 1: Acute Intoxication/Withdrawal  The client's ability to cope with withdrawal symptoms and current state of intoxication       Acute Intoxication/Withdrawal - Current Risk Factor:  1    Reporting sober date of  04/10/2017    Goals: Develop effective strategies to maintain sobriety.   Goals: Be able to handle mild to moderate withdrawal symptoms.    Data:  Client confirms sobriety. Client denies post acute withdrawal symptoms at this time.  No current concerns.     DIMENSION 2:  Biomedical Conditions and Complaints  The degree to which any physical disorder would interfere with treatment for substance abuse and the client's ability to tolerate any related discomfort     Biomedical Conditions and Complaints - Current Risk Factor:   "1    Goals: Follow recommendations of medical provider.     Data:  Client has a medical diagnosis of hypertension, high cholesterol and diabetes.  Client reports being prescribed medications for biomedical health symptoms and reports taking them as prescribed.  No concerns this week.     DIMENSION 3:  Emotional/Behavioral/Cognitive Conditions and Complications  The degree to which any condition or complications are likely to interfere with treatment for substance abuse or with function in significant life areas and the likelihood of risk of harm to self or others.     Emotional/Behavioral - Current Risk Factor:  0    DSM-5 Diagnoses:   Reported by client none     Suicide Assessment:  Risk Status    Ideation - Active thoughts of suicide Intent to follow through on suicide Plan for completing suicide    Yes No Yes No Yes No   Emergent         Urgent / Non-Emergent         Non- Urgent         No Current/Active Risk   x  x  x     Goals: Improve self esteem.     Data:  Client denies mental health diagnosis. Client appears stable. Client denies thoughts of self harm. Client denies experiencing any mental health symptoms. No other concerns at this time. Client has been in a good mood throughout treatment and this week said he as off the charts, a \"12 out of 10.\"    DIMENSION 4:  Readiness to Change  Consider the amount of support and encouragement necessary to keep the client involved in treatment.     Readiness to Change - Current Risk Factor:  1    Goals: Understand the impact your substance use has had on you, your family and significant relationships.  Goals: Increase Internal Motivation.    Data:  Client has moved into the preparation stage of change. Client is motivated for recovery, is spending time with his family and is always in a positive mood. Client reports this week he worked on \"getting back to the way I used to be.\" Client appears internally motivated for recovery.     DIMENSION 5:  Relapse/Continued " "Use/Continued Problem Potential  Consider the degree to which the client recognizes relapse issues and has the skills to prevent relapse of either substance use or mental health problems.     Relapse/Continued Use/Continued Problem Potential - Current Risk Factor:  2    Goals:   1. Develop sober coping and living skills.  2. Identify personal triggers and relapse warning signs.    Data:  Client denies all cravings and triggers this week.     DIMENSION 6:  Recovery Environment  Consider the degree to which key areas of the client's life are supportive of or antagonistic to treatment participation and recovery.     Recovery Environment - Current Risk Factor:  1    Support group attended this week:  No    Did family agree to attend family week: Yes - wife will attend    If yes:  none schedule this week - attended with wife    Goals:   1. Develop sober support network.  2. Resolve issues with employer.  3. Reflect on treatment experience, celebrate accomplishments, anticipate challenges in new sober lifestyle and set goal.     Data:  Client is employed full time.  Client lives with his wife at home. Client has no sober support, outside of his immediate family. Client reports that he will build his sober support network in the coming week by spending time with family. Client reports that since he stop drinking his living situation has been helpful. Client reports no issues in his home life.          Intervention:  Client checked in with group rating his physical and mental health a 12 out of 10 (10 greatest). Client reported that his living situation is the best it has been, very good. Client started Phase 2 and received his RCP packet and his Recovery Care Plan assignment. Client shared with group that he want to accomplish improving his health, peace of mind and strengthening his relationship with his family. Client states \"without the first two reasons, my addiction will come back and isolate me from my family, and my " "wife would for sure divorce me\".     Assessment:  Stages of Change Model  Contemplation  Preparation/Determination     Client has been in a great mood during group sessions. Client appears internally motivated for recovery. Client continues to open up, ask others questions and share insight that he's learned. Client has really changed for the better since he began treatment. Client looks better physically as well.     Plan:    Report to counselor/group any substance use.  Follow all recommendations by medical provider.  Identify and Attend Sober Support Groups.       HERRERA Ngo, Intern       "

## 2017-06-20 ENCOUNTER — HOSPITAL ENCOUNTER (OUTPATIENT)
Dept: BEHAVIORAL HEALTH | Facility: CLINIC | Age: 57
End: 2017-06-20
Attending: SOCIAL WORKER
Payer: COMMERCIAL

## 2017-06-20 PROCEDURE — H2035 A/D TX PROGRAM, PER HOUR: HCPCS | Mod: HQ

## 2017-06-22 NOTE — PROGRESS NOTES
CD ADULT Progress Note     Treatment Plan Review completed on: 06/22/2017    Attendance Dates:  06/20/2017    Total # of Group Phase 2 Sessions this week:  1  Total # of Phase 2 group sessions to date: 2 MONDAY TUESDAY WEDNESDAY THURSDAY FRIDAY SATURDAY SUNDAY Total   Group Therapy           Specialty Groups*           1:1           Family Program           [place]             Phase II    1.5 hours       1.5 hours   Absent           Total  1.5 hours       1.5 hours      *Specialty Groups include Mental Health Care, Assertiveness and Communication, Sobriety Maintenance Skills, Spiritual Care, Stress Management, Relapse Prevention, Family Systems.                    Learning Style:  Verbal  Demonstration    Staff member contributing:  HERRERA Ngo;  SABI Valencia, Intern     Received supervision:  No    Client:  contributed to goals and plan    Did Client receive a copy of treatment plan/revised plan:  Yes    Changes to Treatment Plan:  No    Client agrees with plan/revised plan:  Yes    Any changes in Vulnerable Adult Status:  No    Substance Use Disorders:  Alcohol Use Disorder Moderate (F10.20) and Tobacco Use Disorder Mild (Z72.0)      [facility] Risk Ratings and Data       DIMENSION 1: Acute Intoxication/Withdrawal  The client's ability to cope with withdrawal symptoms and current state of intoxication       Acute Intoxication/Withdrawal - Current Risk Factor:  0    Reporting sober date of  04/10/2017    Goals: Develop effective strategies to maintain sobriety.   Goals: Be able to handle mild to moderate withdrawal symptoms.    Data:  Client confirms sobriety. Client denies post acute withdrawal symptoms at this time.  No current concerns.     DIMENSION 2:  Biomedical Conditions and Complaints  The degree to which any physical disorder would interfere with treatment for substance abuse and the client's ability to tolerate any related discomfort     Biomedical Conditions and Complaints - Current Risk Factor:   1    Goals: Follow recommendations of medical provider.     Data:  Client has a medical diagnosis of hypertension, high cholesterol and diabetes.  Client reports being prescribed medications for biomedical health symptoms and reports taking them as prescribed.  No concerns this week.     DIMENSION 3:  Emotional/Behavioral/Cognitive Conditions and Complications  The degree to which any condition or complications are likely to interfere with treatment for substance abuse or with function in significant life areas and the likelihood of risk of harm to self or others.     Emotional/Behavioral - Current Risk Factor:  0    DSM-5 Diagnoses:   Reported by client none     Suicide Assessment:  Risk Status    Ideation - Active thoughts of suicide Intent to follow through on suicide Plan for completing suicide    Yes No Yes No Yes No   Emergent         Urgent / Non-Emergent         Non- Urgent         No Current/Active Risk   x  x  x     Goals: Improve self esteem.     Data:  Client denies mental health diagnosis. Client appears stable. Client denies thoughts of self harm. Client denies experiencing any mental health symptoms. No other concerns at this time.    DIMENSION 4:  Readiness to Change  Consider the amount of support and encouragement necessary to keep the client involved in treatment.     Readiness to Change - Current Risk Factor:  0    Goals: Understand the impact your substance use has had on you, your family and significant relationships.  Goals: Increase Internal Motivation.    Data:  Client has moved into the preparation stage of change. Client is motivated for recovery, is spending time with his family and is always in a positive mood.  Client appears internally motivated for recovery.     DIMENSION 5:  Relapse/Continued Use/Continued Problem Potential  Consider the degree to which the client recognizes relapse issues and has the skills to prevent relapse of either substance use or mental health problems.  "    Relapse/Continued Use/Continued Problem Potential - Current Risk Factor:  2    Goals:   1. Develop sober coping and living skills.  2. Identify personal triggers and relapse warning signs.    Data:  Client denies all cravings and triggers this week.     DIMENSION 6:  Recovery Environment  Consider the degree to which key areas of the client's life are supportive of or antagonistic to treatment participation and recovery.     Recovery Environment - Current Risk Factor:  1    Support group attended this week:  No    Did family agree to attend family week: Yes - wife will attend    If yes:  none schedule this week - attended with wife    Goals:   1. Develop sober support network.  2. Resolve issues with employer.  3. Reflect on treatment experience, celebrate accomplishments, anticipate challenges in new sober lifestyle and set goal.     Data:  Client is employed full time.  Client lives with his wife at home. Client has no sober support, outside of his immediate family. Client reports that he will build his sober support network in the coming week by spending time with family, talking to his younger brother, and going to [employer]. Client reports that since he stop drinking his living situation has been helpful. Client reports no issues in his home life.          Intervention:  Client checked in with group rating his physical and mental health a 10 out of 10 (10 greatest). Client participated in [facility] page 1 group discussion. Client report values as family, being reliable, and staying honest with his wife and family.  Client shared with group the he is not happy that he will not be able to see his family out of town in July. Client reports he has no PTO to cover a week long vacation. Client reports he would not be able to see his mother for her birthday, and that he would have to physically be near her in order for her to \"remember who I am.\" Client reports he never missed her birthday before, and hopes that he can be " in attendance for his big family gathering and celebration next year. Client shared he was able to talk with both of his daughters, and let them express past hurts about his use, and that he was able to be receptive and they forgave him and states they are very proud of his sobriety, recovery and strength. Client reports he is now looking to rebuild his relationship with his son, as he look forward to having time with him.       Assessment:  Stages of Change Model  Preparation/Determination     Client has been in a great mood during group sessions. Client appears internally motivated for recovery. Client continues to open up, ask others questions and share insight that he's learned. Client has made progress in his sobriety, relationships and physical health since beginning treatment.     Plan:    Report to counselor/group any substance use.  Follow all recommendations by medical provider.  Identify and Attend Sober Support Groups.  Work on RCP for next week.     HERRERA Ngo, Intern

## 2017-06-26 NOTE — TELEPHONE ENCOUNTER
6/26/2017    Monique from [employer] called and left a VM, she wanted to confirm client has been attending his sessions. She also asked when his tentative discharge date maybe.  She left her number for a call back.    HERRERA Ngo

## 2017-06-26 NOTE — TELEPHONE ENCOUNTER
6/26/2017    Writer returned phone call about client. Confirmed he has attended on 06/13 and 06/20. Stated his tentitive discharge would be 08/22 or 08/29.  Left my number for a call back if she has further questions.    Karo Dickinson, LICOC

## 2017-06-27 ENCOUNTER — HOSPITAL ENCOUNTER (OUTPATIENT)
Dept: BEHAVIORAL HEALTH | Facility: CLINIC | Age: 57
End: 2017-06-27
Attending: SOCIAL WORKER
Payer: COMMERCIAL

## 2017-06-27 PROCEDURE — H2035 A/D TX PROGRAM, PER HOUR: HCPCS | Mod: HQ

## 2017-06-29 NOTE — PROGRESS NOTES
CD ADULT Progress Note     Treatment Plan Review completed on: 06/29/2017    Attendance Dates:  06/27/2017    Total # of Group Phase 2 Sessions this week:  1  Total # of Phase 2 group sessions to date: 3     MONDAY TUESDAY WEDNESDAY THURSDAY FRIDAY SATURDAY SUNDAY Total   Group Therapy           Specialty Groups*           1:1           Family Program           [place]             Phase II    1.5 hours       1.5 hours   Absent           Total  1.5 hours       1.5 hours      *Specialty Groups include Mental Health Care, Assertiveness and Communication, Sobriety Maintenance Skills, Spiritual Care, Stress Management, Relapse Prevention, Family Systems.                    Learning Style:  Verbal  Demonstration    Staff member contributing:  HERRERA Ngo;  SABI Valencia, Intern     Received supervision:  No    Client:  contributed to goals and plan    Did Client receive a copy of treatment plan/revised plan:  Yes    Changes to Treatment Plan:  No    Client agrees with plan/revised plan:  Yes    Any changes in Vulnerable Adult Status:  No    Substance Use Disorders:  Alcohol Use Disorder Moderate (F10.20) and Tobacco Use Disorder Mild (Z72.0)      [facility] Risk Ratings and Data       DIMENSION 1: Acute Intoxication/Withdrawal  The client's ability to cope with withdrawal symptoms and current state of intoxication       Acute Intoxication/Withdrawal - Current Risk Factor:  0    Reporting sober date of  04/10/2017    Goals: Develop effective strategies to maintain sobriety.   Goals: Be able to handle mild to moderate withdrawal symptoms.    Data:  Client confirms sobriety. Client denies post acute withdrawal symptoms at this time.  No current concerns.     DIMENSION 2:  Biomedical Conditions and Complaints  The degree to which any physical disorder would interfere with treatment for substance abuse and the client's ability to tolerate any related discomfort     Biomedical Conditions and Complaints - Current Risk Factor:   1    Goals: Follow recommendations of medical provider.     Data:  Client has a medical diagnosis of hypertension, high cholesterol and diabetes.  Client reports being prescribed medications for biomedical health symptoms and reports taking them as prescribed.  No concerns this week.     DIMENSION 3:  Emotional/Behavioral/Cognitive Conditions and Complications  The degree to which any condition or complications are likely to interfere with treatment for substance abuse or with function in significant life areas and the likelihood of risk of harm to self or others.     Emotional/Behavioral - Current Risk Factor:  0    DSM-5 Diagnoses:   Reported by client none     Suicide Assessment:  Risk Status    Ideation - Active thoughts of suicide Intent to follow through on suicide Plan for completing suicide    Yes No Yes No Yes No   Emergent         Urgent / Non-Emergent         Non- Urgent         No Current/Active Risk   x  x  x     Goals: Improve self esteem.     Data:  Client denies mental health diagnosis. Client appears stable. Client denies thoughts of self harm. Client denies experiencing any mental health symptoms. No other concerns at this time.    DIMENSION 4:  Readiness to Change  Consider the amount of support and encouragement necessary to keep the client involved in treatment.     Readiness to Change - Current Risk Factor:  0    Goals: Understand the impact your substance use has had on you, your family and significant relationships.  Goals: Increase Internal Motivation.    Data:  Client has moved into the preparation stage of change. Client is motivated for recovery, is spending time with his family and is always in a positive mood.  Client appears internally motivated for recovery.     DIMENSION 5:  Relapse/Continued Use/Continued Problem Potential  Consider the degree to which the client recognizes relapse issues and has the skills to prevent relapse of either substance use or mental health problems.      Relapse/Continued Use/Continued Problem Potential - Current Risk Factor:  2    Goals:   1. Develop sober coping and living skills.  2. Identify personal triggers and relapse warning signs.    Data:  Client denies all cravings and triggers this week.     DIMENSION 6:  Recovery Environment  Consider the degree to which key areas of the client's life are supportive of or antagonistic to treatment participation and recovery.     Recovery Environment - Current Risk Factor:  1    Support group attended this week:  No    Did family agree to attend family week: Yes - wife will attend    If yes:  none schedule this week - attended with wife    Goals:   1. Develop sober support network.  2. Resolve issues with employer.  3. Reflect on treatment experience, celebrate accomplishments, anticipate challenges in new sober lifestyle and set goal.     Data:  Client is employed full time.  Client lives with his wife at home. Client has no sober support, outside of his immediate family. Client reports that he will build his sober support network in the coming week by spending time with family, talking to his younger brother, and going to [employer]. Client reports that since he stop drinking his living situation has been helpful. Client reports no issues in his home life.          Intervention:  Client checked in with group rating his physical and mental health a 14 out of 10 (10 greatest). Client participated in [facility] page 5 group discussion. Client report he is able to see his mom for her birthday this year. Client shared with the group during the past few weeks of concerns of not having the finances or PTO to leave for [place], but found a way to get to see her due to counselor suggestion of taking a weekend trip vs a week long trip. Client reports being grateful for such feedback and that it has worked out fine that way. Client shared he will be leaving on 07/07/17 and returning 07/09/17.  Client shared his spirituality is centered  around his family.       Assessment:  Stages of Change Model  Preparation/Determination     Client has been in a great mood during group sessions. Client appears internally motivated for recovery. Client continues to open up, ask others questions and share insight that he's learned. Client has made progress in his sobriety, relationships and physical health since beginning treatment.     Plan:    Report to counselor/group any substance use.  Follow all recommendations by medical provider.  Identify and Attend Sober Support Groups.  Work on RCP for next week.     HERRERA Ngo, Intern

## 2017-07-11 ENCOUNTER — HOSPITAL ENCOUNTER (OUTPATIENT)
Dept: BEHAVIORAL HEALTH | Facility: CLINIC | Age: 57
End: 2017-07-11
Attending: SOCIAL WORKER
Payer: COMMERCIAL

## 2017-07-11 PROCEDURE — H2035 A/D TX PROGRAM, PER HOUR: HCPCS | Mod: HQ

## 2017-07-13 NOTE — PROGRESS NOTES
CD ADULT Progress Note     Treatment Plan Review completed on: 07/13/2017    Attendance Dates:  07/11/2017    Total # of Group Phase 2 Sessions this week:  1  Total # of Phase 2 group sessions to date: 4 MONDAY TUESDAY WEDNESDAY THURSDAY FRIDAY SATURDAY SUNDAY Total   Group Therapy           Specialty Groups*           1:1           Family Program           [place]             Phase II    1.5 hours       1.5 hours   Absent           Total  1.5 hours       1.5 hours      *Specialty Groups include Mental Health Care, Assertiveness and Communication, Sobriety Maintenance Skills, Spiritual Care, Stress Management, Relapse Prevention, Family Systems.                    Learning Style:  Verbal  Demonstration    Staff member contributing:  HERRERA Ngo;  SABI Valencia, Intern     Received supervision:  No    Client:  contributed to goals and plan    Did Client receive a copy of treatment plan/revised plan:  Yes    Changes to Treatment Plan:  No    Client agrees with plan/revised plan:  Yes    Any changes in Vulnerable Adult Status:  No    Substance Use Disorders:  Alcohol Use Disorder Moderate (F10.20) and Tobacco Use Disorder Mild (Z72.0)      [facility] Risk Ratings and Data       DIMENSION 1: Acute Intoxication/Withdrawal  The client's ability to cope with withdrawal symptoms and current state of intoxication       Acute Intoxication/Withdrawal - Current Risk Factor:  0    Reporting sober date of  04/10/2017    Goals: Develop effective strategies to maintain sobriety.   Goals: Be able to handle mild to moderate withdrawal symptoms.    Data:  Client confirms sobriety. Client denies post acute withdrawal symptoms this week.     DIMENSION 2:  Biomedical Conditions and Complaints  The degree to which any physical disorder would interfere with treatment for substance abuse and the client's ability to tolerate any related discomfort     Biomedical Conditions and Complaints - Current Risk Factor:  1    Goals: Follow  "recommendations of medical provider.     Data:  Client has a medical diagnosis of hypertension, high cholesterol and diabetes.  Client reports being prescribed medications for biomedical health symptoms and reports taking them as prescribed.    DIMENSION 3:  Emotional/Behavioral/Cognitive Conditions and Complications  The degree to which any condition or complications are likely to interfere with treatment for substance abuse or with function in significant life areas and the likelihood of risk of harm to self or others.     Emotional/Behavioral - Current Risk Factor:  0    DSM-5 Diagnoses:   Reported by client none     Suicide Assessment:  Risk Status    Ideation - Active thoughts of suicide Intent to follow through on suicide Plan for completing suicide    Yes No Yes No Yes No   Emergent         Urgent / Non-Emergent         Non- Urgent         No Current/Active Risk   x  x  x     Goals: Improve self esteem.     Data:  Client denies mental health diagnosis and mental health symptoms. Client appears stable. Client denies thoughts of self harm.       DIMENSION 4:  Readiness to Change  Consider the amount of support and encouragement necessary to keep the client involved in treatment.     Readiness to Change - Current Risk Factor:  0    Goals: Understand the impact your substance use has had on you, your family and significant relationships.  Goals: Increase Internal Motivation.    Data:  Client has moved into the preparation stage of change. Client is motivated for recovery, is   always in a positive mood. Client reports values that were focus for moving his life forward this past week as \"family.\" Client reports he was able to take a weekend trip to go visit his mom for her birthday in [place], and that he enjoyed himself without feeling pressure to drink, and without triggers. Client reports his family was very helpful and understanding. Client appears internally motivated for recovery.     DIMENSION 5:  " Relapse/Continued Use/Continued Problem Potential  Consider the degree to which the client recognizes relapse issues and has the skills to prevent relapse of either substance use or mental health problems.     Relapse/Continued Use/Continued Problem Potential - Current Risk Factor:  2    Goals:   1. Develop sober coping and living skills.  2. Identify personal triggers and relapse warning signs.    Data:  Client denies all cravings and triggers this week.     DIMENSION 6:  Recovery Environment  Consider the degree to which key areas of the client's life are supportive of or antagonistic to treatment participation and recovery.     Recovery Environment - Current Risk Factor:  1    Support group attended this week:  No    Did family agree to attend family week: Yes - wife will attend    If yes:  none schedule this week - attended with wife    Goals:   1. Develop sober support network.  2. Resolve issues with employer.  3. Reflect on treatment experience, celebrate accomplishments, anticipate challenges in new sober lifestyle and set goal.     Data:  Client is employed full time.  Client lives with his wife at home. Client has no sober support, outside of his immediate family. Client reports that he will build his sober support network in the coming week by  talking to his younger brother, and his  at [employer]. Client reports that since he stop drinking his living situation has been helpful.            Intervention:  Client checked in with group rating his physical and mental health a 12 out of 10 (10 greatest). Client participated in group activity Dimensions of Wellness. Client shared that with his wife and family as his support system, that all dimensions of wellness are good.     Assessment:  Stages of Change Model  Preparation/Determination     Client has been in a great mood during group sessions. Client appears internally motivated for recovery. Client has made progress in his sobriety, relationships and  physical health since beginning treatment.     Plan:    Report to counselor/group any substance use.  Follow all recommendations by medical provider.  Identify and Attend Sober Support Groups.  Work on RCP for next week.     HERRERA Worthington, Intern

## 2017-07-17 NOTE — TELEPHONE ENCOUNTER
7/17/2017    Noreen called today at 03:10pm to see client's attendance if he has been coming to group.  Writer confirmed he was in group on 06/27 and 07/11 and that his discharge date has not been changed.  We ended the call.     HERRERA Ngo

## 2017-07-18 ENCOUNTER — HOSPITAL ENCOUNTER (OUTPATIENT)
Dept: BEHAVIORAL HEALTH | Facility: CLINIC | Age: 57
End: 2017-07-18
Attending: SOCIAL WORKER
Payer: COMMERCIAL

## 2017-07-18 PROCEDURE — H2035 A/D TX PROGRAM, PER HOUR: HCPCS | Mod: HQ

## 2017-07-19 NOTE — PROGRESS NOTES
CD ADULT Progress Note     Treatment Plan Review completed on: 07/19/2017    Attendance Dates:  07/18/2017    Total # of Group Phase 2 Sessions this week:  1  Total # of Phase 2 group sessions to date: 5 MONDAY TUESDAY WEDNESDAY THURSDAY FRIDAY SATURDAY SUNDAY Total   Group Therapy           Specialty Groups*           1:1           Family Program           [place]             Phase II    1.5 hours       1.5 hours   Absent           Total  1.5 hours       1.5 hours      *Specialty Groups include Mental Health Care, Assertiveness and Communication, Sobriety Maintenance Skills, Spiritual Care, Stress Management, Relapse Prevention, Family Systems.                    Learning Style:  Verbal  Demonstration    Staff member contributing:  HERRERA Ngo     Received supervision:  No    Client:  contributed to goals and plan    Did Client receive a copy of treatment plan/revised plan:  Yes    Changes to Treatment Plan:  No    Client agrees with plan/revised plan:  Yes    Any changes in Vulnerable Adult Status:  No    Substance Use Disorders:  Alcohol Use Disorder Moderate (F10.20) and Tobacco Use Disorder Mild (Z72.0)      [facility] Risk Ratings and Data       DIMENSION 1: Acute Intoxication/Withdrawal  The client's ability to cope with withdrawal symptoms and current state of intoxication       Acute Intoxication/Withdrawal - Current Risk Factor:  0    Reporting sober date of  04/10/2017    Goals: Develop effective strategies to maintain sobriety.   Goals: Be able to handle mild to moderate withdrawal symptoms.    Data:  Client confirms sobriety. Client denies post acute withdrawal symptoms this week.     DIMENSION 2:  Biomedical Conditions and Complaints  The degree to which any physical disorder would interfere with treatment for substance abuse and the client's ability to tolerate any related discomfort     Biomedical Conditions and Complaints - Current Risk Factor:  1    Goals: Follow recommendations of medical  "provider.     Data:  Client has a medical diagnosis of hypertension, high cholesterol and diabetes.  Client reports being prescribed medications for biomedical health symptoms and reports taking them as prescribed.    DIMENSION 3:  Emotional/Behavioral/Cognitive Conditions and Complications  The degree to which any condition or complications are likely to interfere with treatment for substance abuse or with function in significant life areas and the likelihood of risk of harm to self or others.     Emotional/Behavioral - Current Risk Factor:  0    DSM-5 Diagnoses:   Reported by client none     Suicide Assessment:  Risk Status    Ideation - Active thoughts of suicide Intent to follow through on suicide Plan for completing suicide    Yes No Yes No Yes No   Emergent         Urgent / Non-Emergent         Non- Urgent         No Current/Active Risk   x  x  x     Goals: Improve self esteem.     Data:  Client denies mental health diagnosis and mental health symptoms. Client appears stable. Client denies thoughts of self harm.       DIMENSION 4:  Readiness to Change  Consider the amount of support and encouragement necessary to keep the client involved in treatment.     Readiness to Change - Current Risk Factor:  0    Goals: Understand the impact your substance use has had on you, your family and significant relationships.  Goals: Increase Internal Motivation.    Data:  Client appears in the preparation stage of change. Client is motivated for recovery, is always in a positive mood. Client reports values that were focus for moving his life forward this past week as \"family.\" Client reports he was able to take a weekend trip to go visit his mom for her birthday in [place], and that he enjoyed himself without feeling pressure to drink, and without triggers. Client reports his family was very helpful and understanding. Client appears internally motivated for recovery.     DIMENSION 5:  Relapse/Continued Use/Continued Problem " "Potential  Consider the degree to which the client recognizes relapse issues and has the skills to prevent relapse of either substance use or mental health problems.     Relapse/Continued Use/Continued Problem Potential - Current Risk Factor:  2    Goals:   1. Develop sober coping and living skills.  2. Identify personal triggers and relapse warning signs.    Data:  Client denies all cravings and triggers this week.     DIMENSION 6:  Recovery Environment  Consider the degree to which key areas of the client's life are supportive of or antagonistic to treatment participation and recovery.     Recovery Environment - Current Risk Factor:  1    Support group attended this week:  No    Did family agree to attend family week: Yes - wife will attend    If yes:  none schedule this week - attended with wife    Goals:   1. Develop sober support network.  2. Resolve issues with employer.  3. Reflect on treatment experience, celebrate accomplishments, anticipate challenges in new sober lifestyle and set goal.     Data:  Client is employed full time.  Client lives with his wife at home. Client has no sober support, outside of his immediate family. Client reports that he will build his sober support network in the coming week by talking to his younger brother, and his  at [employer]. Client reports that since he stop drinking his living situation has been helpful. Client reports his family got a puppy over the weekend and client was excited about that. Client reports its nice to get greeted from the puppy when he arrives home and that it gets him out of the house more to take the puppy on walks with his wife.            Intervention:  Client checked in with group rating his physical and mental health a 13 out of 10 (10 greatest). Client shared how great things are in his life with his sobriety. Client said his sister called his wife and said \"I'm so proud of your , he didn't think about drinking once while in [place].\" " "Client joked with the group and said, \"well that's a lie. I thought about drinking milk, Gatorade, lemonade and water.\" Client said he came prepared with other options to drink other than alcohol.   Assessment:  Stages of Change Model  Preparation/Determination  Action     Client has been in a great mood during group sessions. Client appears internally motivated for recovery. Client has made progress in his sobriety, relationships and physical health since beginning treatment.     Plan:    Report to counselor/group any substance use.  Follow all recommendations by medical provider.  Identify and Attend Sober Support Groups.  Work on RCP for next week.     HERRERA Ngo      "

## 2017-07-25 ENCOUNTER — HOSPITAL ENCOUNTER (OUTPATIENT)
Dept: BEHAVIORAL HEALTH | Facility: CLINIC | Age: 57
End: 2017-07-25
Attending: SOCIAL WORKER
Payer: COMMERCIAL

## 2017-07-25 PROCEDURE — H2035 A/D TX PROGRAM, PER HOUR: HCPCS | Mod: HQ

## 2017-07-26 NOTE — TELEPHONE ENCOUNTER
7/26/2017     Counselor called Monique back at 12:50pm and left a VM. Counselor stated that client attended his sessions in question, has attended all sessions to date and his tentative discharge is 08/22/2017.  Left my number for a call back if she has further questions.    Karo Dickinson, [facility][note 3]

## 2017-07-26 NOTE — TELEPHONE ENCOUNTER
7/26/2017    Monique called on today's date and left a VM at 11:12am. She was wondering if client had attended his sessions on 07/18 and 07/25. She left her number for call back.    HERRERA Ngo

## 2017-07-27 NOTE — PROGRESS NOTES
CD ADULT Progress Note     Treatment Plan Review completed on: 07/27/2017    Attendance Dates:  07/25/2017    Total # of Group Phase 2 Sessions this week:  1  Total # of Phase 2 group sessions to date: 6 MONDAY TUESDAY WEDNESDAY THURSDAY FRIDAY SATURDAY SUNDAY Total   Group Therapy           Specialty Groups*           1:1           Family Program           [place]             Phase II    1.5 hours       1.5 hours   Absent           Total  1.5 hours       1.5 hours      *Specialty Groups include Mental Health Care, Assertiveness and Communication, Sobriety Maintenance Skills, Spiritual Care, Stress Management, Relapse Prevention, Family Systems.                    Learning Style:  Verbal  Demonstration    Staff member contributing:  HERRERA Ngo; SABI Valencia, Intern     Received supervision:  No    Client:  contributed to goals and plan    Did Client receive a copy of treatment plan/revised plan:  Yes    Changes to Treatment Plan:  No    Client agrees with plan/revised plan:  Yes    Any changes in Vulnerable Adult Status:  No    Substance Use Disorders:  Alcohol Use Disorder Moderate (F10.20) and Tobacco Use Disorder Mild (Z72.0)      ASA Risk Ratings and Data       DIMENSION 1: Acute Intoxication/Withdrawal  The client's ability to cope with withdrawal symptoms and current state of intoxication       Acute Intoxication/Withdrawal - Current Risk Factor:  0    Reporting sober date of  04/10/2017    Goals: Develop effective strategies to maintain sobriety.   Goals: Be able to handle mild to moderate withdrawal symptoms.    Data:  Client confirms sobriety. Client denies post acute withdrawal symptoms this week. No change.    DIMENSION 2:  Biomedical Conditions and Complaints  The degree to which any physical disorder would interfere with treatment for substance abuse and the client's ability to tolerate any related discomfort     Biomedical Conditions and Complaints - Current Risk Factor:  1    Goals:  "Follow recommendations of medical provider.     Data:  Client has a medical diagnosis of hypertension, high cholesterol and diabetes.  Client reports being prescribed medications for biomedical health symptoms and reports taking them as prescribed. No change this week.     DIMENSION 3:  Emotional/Behavioral/Cognitive Conditions and Complications  The degree to which any condition or complications are likely to interfere with treatment for substance abuse or with function in significant life areas and the likelihood of risk of harm to self or others.     Emotional/Behavioral - Current Risk Factor:  0    DSM-5 Diagnoses:   Reported by client none     Suicide Assessment:  Risk Status    Ideation - Active thoughts of suicide Intent to follow through on suicide Plan for completing suicide    Yes No Yes No Yes No   Emergent         Urgent / Non-Emergent         Non- Urgent         No Current/Active Risk   x  x  x     Goals: Improve self esteem.     Data:  Client denies mental health diagnosis and mental health symptoms. Client appears stable. Client denies thoughts of self harm.       DIMENSION 4:  Readiness to Change  Consider the amount of support and encouragement necessary to keep the client involved in treatment.     Readiness to Change - Current Risk Factor:  0    Goals: Understand the impact your substance use has had on you, your family and significant relationships.  Goals: Increase Internal Motivation.    Data:  Client appears in the preparation stage of change. Client is internally motivated for recovery, is always in a positive mood. Client reports values that were focus for moving his life forward this past week as \"family.\" Client reports he have no idle time and that he has not isolate. Client reports staying busy with priorities and with family.      DIMENSION 5:  Relapse/Continued Use/Continued Problem Potential  Consider the degree to which the client recognizes relapse issues and has the skills to " prevent relapse of either substance use or mental health problems.     Relapse/Continued Use/Continued Problem Potential - Current Risk Factor:  2    Goals:   1. Develop sober coping and living skills.  2. Identify personal triggers and relapse warning signs.    Data:  Client denies all cravings and triggers this week.     DIMENSION 6:  Recovery Environment  Consider the degree to which key areas of the client's life are supportive of or antagonistic to treatment participation and recovery.     Recovery Environment - Current Risk Factor:  1    Support group attended this week:  No    Did family agree to attend family week: Yes - wife will attend    If yes:  none schedule this week - attended with wife    Goals:   1. Develop sober support network.  2. Resolve issues with employer.  3. Reflect on treatment experience, celebrate accomplishments, anticipate challenges in new sober lifestyle and set goal.     Data:  Client is employed full time.  Client lives with his wife at home. Client has no sober support, outside of his immediate family. Client reports that he will build his sober support network in the coming week by talking to his younger brother and his  at [employer]. Client reports that since he stopped drinking his living situation has been helpful.            Intervention:  Client checked in with group rating his physical and mental health a 11 out of 10 (10 greatest). Client participated in group RCP discussion page 1. Client shared that his goals is to stay sober and that staying sober is an everyday challenge of his. Client reports if he go back to using he will lose his marriage and his family. Client reports that his new puppy is helping him and his wife to bond closer and have time together.     Assessment:  Stages of Change Model  Preparation/Determination  Action     Client has been in a great mood during group sessions. Client appears internally motivated for recovery. Client has made progress in  his sobriety, relationships and physical health since beginning treatment. Client is only lacking in sober support meetings and having sober support outside of his family.     Plan:    Report to counselor/group any substance use.  Follow all recommendations by medical provider.  Identify and Attend Sober Support Groups.  Work on RCP for next week.     HERRERA Ngo, Intern

## 2017-08-01 ENCOUNTER — HOSPITAL ENCOUNTER (OUTPATIENT)
Dept: BEHAVIORAL HEALTH | Facility: CLINIC | Age: 57
End: 2017-08-01
Attending: SOCIAL WORKER
Payer: COMMERCIAL

## 2017-08-01 PROCEDURE — H2035 A/D TX PROGRAM, PER HOUR: HCPCS | Mod: HQ

## 2017-08-03 NOTE — PROGRESS NOTES
CD ADULT Progress Note     Treatment Plan Review completed on: 08/03/2017    Attendance Dates:  08/01/2017    Total # of Group Phase 2 Sessions this week:  1  Total # of Phase 2 group sessions to date: 7 MONDAY TUESDAY WEDNESDAY THURSDAY FRIDAY SATURDAY SUNDAY Total   Group Therapy           Specialty Groups*           1:1           Family Program           [place]             Phase II    1.5 hours       1.5 hours   Absent           Total  1.5 hours       1.5 hours      *Specialty Groups include Mental Health Care, Assertiveness and Communication, Sobriety Maintenance Skills, Spiritual Care, Stress Management, Relapse Prevention, Family Systems.                    Learning Style:  Verbal  Demonstration    Staff member contributing:  HERRERA Myers; SABI Valencia, Intern     Received supervision:  No    Client:  contributed to goals and plan    Did Client receive a copy of treatment plan/revised plan:  Yes    Changes to Treatment Plan:  No    Client agrees with plan/revised plan:  Yes    Any changes in Vulnerable Adult Status:  No    Substance Use Disorders:  Alcohol Use Disorder Moderate (F10.20) and Tobacco Use Disorder Mild (Z72.0)      [facility] Risk Ratings and Data       DIMENSION 1: Acute Intoxication/Withdrawal  The client's ability to cope with withdrawal symptoms and current state of intoxication       Acute Intoxication/Withdrawal - Current Risk Factor:  0    Reporting sober date of  04/10/2017    Goals: Develop effective strategies to maintain sobriety.   Goals: Be able to handle mild to moderate withdrawal symptoms.    Data:  Client confirms sobriety. Client displays no signs of withdrawal or intoxication symptoms this past week.    DIMENSION 2:  Biomedical Conditions and Complaints  The degree to which any physical disorder would interfere with treatment for substance abuse and the client's ability to tolerate any related discomfort     Biomedical Conditions and Complaints - Current Risk Factor:   "1    Goals: Follow recommendations of medical provider.     Data:  Client has a medical diagnosis of hypertension, high cholesterol and diabetes.  Client reports being prescribed medications for biomedical health symptoms and reports taking them as prescribed.     DIMENSION 3:  Emotional/Behavioral/Cognitive Conditions and Complications  The degree to which any condition or complications are likely to interfere with treatment for substance abuse or with function in significant life areas and the likelihood of risk of harm to self or others.     Emotional/Behavioral - Current Risk Factor:  0    DSM-5 Diagnoses:   Reported by client none     Suicide Assessment:  Risk Status    Ideation - Active thoughts of suicide Intent to follow through on suicide Plan for completing suicide    Yes No Yes No Yes No   Emergent         Urgent / Non-Emergent         Non- Urgent         No Current/Active Risk   x  x  x     Goals: Improve self esteem.     Data:  Client denies mental health diagnosis and mental health symptoms. Client appears stable. Client denies thoughts of self harm.       DIMENSION 4:  Readiness to Change  Consider the amount of support and encouragement necessary to keep the client involved in treatment.     Readiness to Change - Current Risk Factor:  0    Goals: Understand the impact your substance use has had on you, your family and significant relationships.  Goals: Increase Internal Motivation.    Data:  Client appears in the preparation stage of change. Client is internally motivated for recovery and  always in a positive mood. Client reports values that were focus for moving his life forward this past week as \"family.\"     DIMENSION 5:  Relapse/Continued Use/Continued Problem Potential  Consider the degree to which the client recognizes relapse issues and has the skills to prevent relapse of either substance use or mental health problems.     Relapse/Continued Use/Continued Problem Potential - Current Risk " Factor:  2    Goals:   1. Develop sober coping and living skills.  2. Identify personal triggers and relapse warning signs.    Data:  Client denies all cravings and triggers this week.     DIMENSION 6:  Recovery Environment  Consider the degree to which key areas of the client's life are supportive of or antagonistic to treatment participation and recovery.     Recovery Environment - Current Risk Factor:  1    Support group attended this week:  No    Did family agree to attend family week: Yes - wife will attend    If yes:  none schedule this week - attended with wife    Goals:   1. Develop sober support network.  2. Resolve issues with employer.  3. Reflect on treatment experience, celebrate accomplishments, anticipate challenges in new sober lifestyle and set goal.     Data:  Client is employed full time.  Client lives with his wife at home. Client has sober support from his [employer], and his immediate family. Client reports that he will build his sober support network in the coming week by talking to his younger brother and his  at [employer].   Client has made progress in his sobriety, relationships and physical health since beginning treatment.  Client is only lacking in sober support meetings and having sober support outside of his family.            Intervention:  Process group; relapse and improving family relationship and address confrontation     Assessment:  Stages of Change Model  Preparation/Determination  Action     Client participated in group discussion and that the only time he is reminded that he has a substance abuse problem is when he attends group.  Client has been in a great mood during group sessions.       Plan:    Report to counselor/group any substance use.  Follow all recommendations by medical provider.  Identify and Attend Sober Support Groups.  Work on RCP for next week.     Delmy Michel MA, HERRERA Valencia, Intern

## 2017-08-08 ENCOUNTER — HOSPITAL ENCOUNTER (OUTPATIENT)
Dept: BEHAVIORAL HEALTH | Facility: CLINIC | Age: 57
End: 2017-08-08
Attending: SOCIAL WORKER
Payer: COMMERCIAL

## 2017-08-08 PROCEDURE — H2035 A/D TX PROGRAM, PER HOUR: HCPCS | Mod: HQ

## 2017-08-09 NOTE — TELEPHONE ENCOUNTER
8/9/2017    Monique left message to see if client has attended his treatment session. Monique also wants confirmation of his discharge date.    HERRERA Ngo

## 2017-08-09 NOTE — TELEPHONE ENCOUNTER
8/9/2017    Called Monique back and left a VM and confirmed his discharge date of 08/15/2017. Stated that he has been coming the last two weeks.     HERRERA Ngo

## 2017-08-10 NOTE — PROGRESS NOTES
CD ADULT Progress Note     Treatment Plan Review completed on: 08/10/2017    Attendance Dates:  08/08/2017    Total # of Group Phase 2 Sessions this week:  1  Total # of Phase 2 group sessions to date: 8 MONDAY TUESDAY WEDNESDAY THURSDAY FRIDAY SATURDAY SUNDAY Total   Group Therapy           Specialty Groups*           1:1           Family Program           [place]             Phase II    1.5 hours       1.5 hours   Absent           Total  1.5 hours       1.5 hours      *Specialty Groups include Mental Health Care, Assertiveness and Communication, Sobriety Maintenance Skills, Spiritual Care, Stress Management, Relapse Prevention, Family Systems.                    Learning Style:  Verbal  Demonstration    Staff member contributing: HERRERA Ngo; SABI Valencia, Intern     Received supervision:  No    Client:  contributed to goals and plan    Did Client receive a copy of treatment plan/revised plan:  Yes    Changes to Treatment Plan:  No    Client agrees with plan/revised plan:  Yes    Any changes in Vulnerable Adult Status:  No    Substance Use Disorders:  Alcohol Use Disorder Moderate (F10.20) and Tobacco Use Disorder Mild (Z72.0)      [facility] Risk Ratings and Data       DIMENSION 1: Acute Intoxication/Withdrawal  The client's ability to cope with withdrawal symptoms and current state of intoxication       Acute Intoxication/Withdrawal - Current Risk Factor:  0    Reporting sober date of  04/10/2017    Goals: Develop effective strategies to maintain sobriety.   Goals: Be able to handle mild to moderate withdrawal symptoms.    Data:  Client confirms sobriety. Client denies PAWS this week. No change.    DIMENSION 2:  Biomedical Conditions and Complaints  The degree to which any physical disorder would interfere with treatment for substance abuse and the client's ability to tolerate any related discomfort     Biomedical Conditions and Complaints - Current Risk Factor:  1    Goals: Follow recommendations of  "medical provider.     Data:  Client has a medical diagnosis of hypertension, high cholesterol and diabetes.  Client reports being prescribed medications for biomedical health symptoms and reports taking them as prescribed.     DIMENSION 3:  Emotional/Behavioral/Cognitive Conditions and Complications  The degree to which any condition or complications are likely to interfere with treatment for substance abuse or with function in significant life areas and the likelihood of risk of harm to self or others.     Emotional/Behavioral - Current Risk Factor:  0    DSM-5 Diagnoses:   Reported by client none     Suicide Assessment:  Risk Status    Ideation - Active thoughts of suicide Intent to follow through on suicide Plan for completing suicide    Yes No Yes No Yes No   Emergent         Urgent / Non-Emergent         Non- Urgent         No Current/Active Risk   x  x  x     Goals: Improve self esteem.     Data:  Client denies mental health diagnosis and mental health symptoms. Client appears stable. Client denies thoughts of self harm.       DIMENSION 4:  Readiness to Change  Consider the amount of support and encouragement necessary to keep the client involved in treatment.     Readiness to Change - Current Risk Factor:  0    Goals: Understand the impact your substance use has had on you, your family and significant relationships.  Goals: Increase Internal Motivation.    Data:  Client appears in the preparation stage of change. Client is internally motivated for recovery and  always in a positive mood. Client rated his motivation for sobriety at a 10 of 10(10 highest motivation). Client reports values that were focus for moving his life forward this past week as \"family.\"     DIMENSION 5:  Relapse/Continued Use/Continued Problem Potential  Consider the degree to which the client recognizes relapse issues and has the skills to prevent relapse of either substance use or mental health problems.     Relapse/Continued " Use/Continued Problem Potential - Current Risk Factor:  2    Goals:   1. Develop sober coping and living skills.  2. Identify personal triggers and relapse warning signs.    Data:  Client denies all cravings and triggers this week. No change.     DIMENSION 6:  Recovery Environment  Consider the degree to which key areas of the client's life are supportive of or antagonistic to treatment participation and recovery.     Recovery Environment - Current Risk Factor:  1    Support group attended this week:  No    Did family agree to attend family week: Yes - wife will attend    If yes:  none schedule this week - attended with wife    Goals:   1. Develop sober support network.  2. Resolve issues with employer.  3. Reflect on treatment experience, celebrate accomplishments, anticipate challenges in new sober lifestyle and set goal.     Data:  Client is employed full time.  Client lives with his wife at home. Client has sober support from his [employer], and his immediate family. Client reports that he will build his sober support network in the coming week by talking to his younger brother and his  at [employer]. Client has made progress in his sobriety, relationships and physical health since beginning treatment.  Client is only lacking in sober support meetings and having sober support outside of his family. Client rated his motivation to attend sober support meetings this week as a 0 of 10 (0 no motivation, 10 highest motivation).           Intervention:  Client checked in with group rating his mental and physical health a 12 out of 10. Client was reserved during group, but gave positive feedback to 3 group members that completed treatment.     Assessment:  Stages of Change Model  Preparation/Determination  Action     Client participated in group discussion and that the only time he is reminded that he has a substance abuse problem is when he attends group.  Client has been in a great mood during group sessions.        Plan:    Report to counselor/group any substance use.  Follow all recommendations by medical provider.  Identify and Attend Sober Support Groups.  Work on RCP for next week.   Complete final assignments to present next week and complete treatment.     HERRERA Ngo, Intern

## 2017-08-15 ENCOUNTER — HOSPITAL ENCOUNTER (OUTPATIENT)
Dept: BEHAVIORAL HEALTH | Facility: CLINIC | Age: 57
End: 2017-08-15
Attending: SOCIAL WORKER
Payer: COMMERCIAL

## 2017-08-15 PROCEDURE — H2035 A/D TX PROGRAM, PER HOUR: HCPCS | Mod: HQ

## 2017-08-15 NOTE — TELEPHONE ENCOUNTER
8/15/2017    Monique left a VM for writer confirming client's discharge date. Also wanted to know if he has an aftercare program. Left her number for a return call.    HERRERA Ngo

## 2017-08-15 NOTE — TELEPHONE ENCOUNTER
8/15/2017    Returned Monique's call. Let Monique know he will complete treatment tonight and will fax over all the aftercare recommendations - either tomorrow or Thursday. She said great, and gave me her fax: [phone]. She said she'd contact me if she had further questions.    HERRERA Ngo

## 2017-08-16 NOTE — PROGRESS NOTES
[facility]     Patient: Jerad Alvarado  MRN: [id number]   : 1960 Age: 57 year old Sex: male   -  Focus of Treatment / Discharge Recommendations    Personal Safety/ Management of Symptoms    * Follow your safety plan.  Report increased symptoms to your care team and /or go to the nearest Emergency Department.    * Call crisis lines as needed    [facility] [phone]                [facility] [phone]  [facility] [phone]              [facility] [phone]  [facility] [phone]              [facility] [phone]  [facility] [phone]          National Suicide Prevention [phone]  [facility] [phone]            Suicide Prevention [phone]  [facility] [phone]    Abstinence/Relapse Prevention  * Take all medicines as directed.  Carry a current list of medicines with you.  * Use coping skills: keep busy, open communication with others  * Do not use illicit (street) drugs, controlled substances (narcotics) or alcohol.    Develop/Improve Independent Living/Socialization Skills:   Continue to grow sober support    Community Resources/Supports and Discharge Planning:    Go to group therapy and / or support groups at: TBD    See your medical doctor about:  As needed    Other:  Comply with employer requirements.     Client Signature:_______________________   Date / Time:___________  Staff Signature:________________________   Date / Time:___________

## 2017-08-16 NOTE — PROGRESS NOTES
CHEMICAL DEPENDENCY DISCHARGE SUMMARY      EVALUATION COUNSELOR:  HERRERA Mccarty.   TREATMENT COUNSELOR:  HERRERA Ngo.     REFERRAL SOURCE:  Self and employer.     PROGRAM:  [facility] Intensive Outpatient Chemical Dependency Program in [place].     ADMISSION DATE:  04/12/2017.   LAST SESSION DATE:  08/15/2017.   DISCHARGE DATE:  08/15/2017.     ADMISSION IMPRESSION:   1.  Alcohol use disorder, moderate, F10.20/303.90.   2.  Tobacco use disorder, Z72.20/305.1.     DISCHARGE IMPRESSION:     1.  Alcohol use disorder, moderate, F10.20/303.90.   2.  Tobacco use disorder, Z72.20/305.1.     REASON FOR DISCHARGE:  Client successfully completed the program.     LAST USE DATE:  Client reports the last date of use of alcohol as 04/09/2017.     HOURS OF TREATMENT COMPLETED:  54 hours.      REASON FOR EVALUATION:  Jerad Alvarado had a chemical evaluation on 04/10/2017 with HERRERA Mccarty.  Client reported he went to work this morning at around 7:00 a.m. and was confronted at work for appearing intoxicated.  They stated he was wobbly, smelled of alcohol.  His GUZMAN was over 0.20.  His wife was called and she was asked to pick him up and he is temporarily suspended until he has an assessment and follows recommendations.      SERVICES PROVIDED:  Included treatment and discharge planning, psychoeducation, recovery skill building, relapse prevention skills, problem solving, managing conflicts, clarifying values, expressing feelings, emotional management, social skill building, 12-step facilitation, family and concerned person group, and group therapy.      ISSUES ADDRESSED IN TREATMENT:   DIMENSION 1:  Acute Withdrawal Issues and Detox:  Admit risk rating 1.  Discharge risk rating 0.  The client reports the last date of use of alcohol as 04/09/2017.  Client is a daily nicotine user.  Client denied any use episodes.  Client denied any acute withdrawal symptoms or concerns.  Client did not  "show signs of intoxication or withdrawal while attending treatment.  No other concerns in this dimension at time of discharge.      DIMENSION 2:  Biomedical Conditions and/or Complications:  Admit risk rating 1.  Discharge risk rating 0.  Client reports a medical diagnosis of hypertension, high cholesterol and diabetes.  Client reports he takes his medication for his biomedical symptoms.  Client reported his medical issues were improving with his sobriety.  Client is able to obtain services as needed and has a primary care provider.  No other concerns in this dimension at time of discharge.      DIMENSION 3:  Emotional and Behavioral:  Admit risk rating 1.  Discharge risk rating 0.  Client denies any mental health issues or diagnosis.  Client is a .  Client denies any ongoing medica/mental issues with being a .  Client listed 5 ways his addiction impacted his mental health.  Client reported he was isolated and did not hang out with family or wife while using.  Client's mood improved greatly during treatment with his recovery.  Client would often report he was a \"12/10\" and how great he was feeling week to week.  Client expressed that he was really happy and how his family life has improved.  Client denied any thoughts of self-harm.  Client's additional risk factors include substance abuse, while protective factors include having people in his life that would prevent him from considering committing suicide and easy access to supportive family and friends.  No other concerns in this dimension at time of discharge.      DIMENSION 4:  Readiness for Change:  Admit risk rating 3, discharge risk rating 0.  Client reported consequences to his alcohol use such as relationship issues, health issues, and now work related issues.  Client appeared to lack internal motivation when he began group therapy.  Client presented his usage history and discussed consequences of his use and 5 values that were violated.  " "Client shared that he did not take care of himself and his marriage was not good.  Client participated in spiritual care group and wrote down 3 reasons to remain sober.  Client's wife participated in family and concerned person group.  Client stages of change moved up to preparation/determination throughout treatment.  Client's motivation is now more internal than external and client stated he is hopeful to remain sober the remaining of his life.  Client reported he made a bucket list and wants to complete that now that he is sober.  No other concerns in this dimension at time of discharge.      DIMENSION 5:  Relapse & Continued Problem Potential:  Admit risk rating 3, discharge risk rating 1.  Client's goal is to identify personal triggers and relapse warning signs.  Client completed his cravings and triggers assignment and shared his drinking just became a routine.  Client reported drinking daily and would isolate himself afterward.  Client reports his relationship with his wife and children have improved and makes an effort to spend time with his family.  Client reports having \"no desire to return to my old ways, I am happy with how I am at now.\"  Client did not attend any sober support groups outside of treatment.  No other concerns in this dimension at time of discharge.      DIMENSION 6:  Recovery Environment:  Admit risk rating 2, discharge risk rating 1.  Client is in employed full-time.  Client was in treatment due to employment requirements.  Client's goal was to comply with his employer, which he did.  Client shared his final assignments including his recovery care packet and worksheet.  Client was to gain sober leisure activities.  Client reported adopting a puppy and going for walks daily with his wife, doing chores around the house, and spending time with his grandchild as sober activities.  No other concerns in this dimension at time of discharge.      PROGNOSIS:  This client has a favorable " prognosis.      STRENGTHS:  Client is always in a great mood.  Client is always positive.  Client has a good family support system.      LIVING ARRANGEMENTS AT DISCHARGE:  Client lives with his wife.      CONTINUATION OF CARE RECOMMENDATIONS:    Jerad is to abstain from all non-prescribed mood-altering substances.    Jerad is recommended to continue managing his mental and physical health with his healthcare providers as directed to do so.    Jerad is recommended to attend sober support meetings on a weekly and regular basis.    Jerad is recommended to continue to remain law abiding.    Jerad is recommended to follow all conditions of his employer.         This information has been disclosed to you from records protected by Federal confidentiality rules (42 CFR part 2). The Federal rules prohibit you from making any further disclosure of this information unless further disclosure is expressly permitted by the written consent of the person to whom it pertains or as otherwise permitted by 42 CFR part 2. A general authorization for the release of medical or other information is NOT sufficient for this purpose. The Federal rules restrict any use of the information to criminally investigate or prosecute any alcohol or drug abuse patient.      ARTIS QUIGLEY, JANE             D: 08/15/2017 21:09   T: 2017 04:11   MT: lg      Name:     JERAD CHILDRESS   MRN:      [id number]        Account:      [id number]   :      1960           Visit Date:   08/15/2017      Document: [id number]

## 2017-08-16 NOTE — PROGRESS NOTES
Acknowledgement of Current Treatment Plan       I have reviewed my treatment plan with my therapist / counselor on 08/15/2017. I agree with the plan as it is written in the electronic health record.  Last date of use reported as 04/09/2017    Name Signature   Jerad Alvarado    Name of Therapist / Counselor    [facility]

## 2017-08-16 NOTE — PROGRESS NOTES
CD ADULT Progress Note     Treatment Plan Review completed on: 08/16/2017    Attendance Dates:  08/15/2017    Total # of Group Phase 2 Sessions this week:  1  Total # of Phase 2 group sessions to date: 9 MONDAY TUESDAY WEDNESDAY THURSDAY FRIDAY SATURDAY SUNDAY Total   Group Therapy           Specialty Groups*           1:1           Family Program           [place]             Phase II    1.5 hours       1.5 hours   Absent           Total  1.5 hours       1.5 hours      *Specialty Groups include Mental Health Care, Assertiveness and Communication, Sobriety Maintenance Skills, Spiritual Care, Stress Management, Relapse Prevention, Family Systems.                    Learning Style:  Verbal  Demonstration    Staff member contributing: HERRERA Ngo     Received supervision:  No    Client:  contributed to goals and plan    Did Client receive a copy of treatment plan/revised plan:  Yes    Changes to Treatment Plan:  No    Client agrees with plan/revised plan:  Yes    Any changes in Vulnerable Adult Status:  No    Substance Use Disorders:  Alcohol Use Disorder Moderate (F10.20) and Tobacco Use Disorder Mild (Z72.0)      [facility] Risk Ratings and Data       DIMENSION 1: Acute Intoxication/Withdrawal  The client's ability to cope with withdrawal symptoms and current state of intoxication       Acute Intoxication/Withdrawal - Current Risk Factor:  0    Reporting sober date of  04/10/2017    Goals: Develop effective strategies to maintain sobriety.   Goals: Be able to handle mild to moderate withdrawal symptoms.    Data:  Client confirms sobriety.  No change.    DIMENSION 2:  Biomedical Conditions and Complaints  The degree to which any physical disorder would interfere with treatment for substance abuse and the client's ability to tolerate any related discomfort     Biomedical Conditions and Complaints - Current Risk Factor:  0    Goals: Follow recommendations of medical provider.     Data:  No current concerns.  "Client is able to obtain services as needed.     DIMENSION 3:  Emotional/Behavioral/Cognitive Conditions and Complications  The degree to which any condition or complications are likely to interfere with treatment for substance abuse or with function in significant life areas and the likelihood of risk of harm to self or others.     Emotional/Behavioral - Current Risk Factor:  0    DSM-5 Diagnoses:   Reported by client none     Suicide Assessment:  Risk Status    Ideation - Active thoughts of suicide Intent to follow through on suicide Plan for completing suicide    Yes No Yes No Yes No   Emergent         Urgent / Non-Emergent         Non- Urgent         No Current/Active Risk   x  x  x     Goals: Improve self esteem.     Data:  Client denies mental health diagnosis and mental health symptoms. Client appears stable. Client was in a happy, positive mood during group. Client rated himself a \"11/10, feeling great.\" Client denies thoughts of self harm.       DIMENSION 4:  Readiness to Change  Consider the amount of support and encouragement necessary to keep the client involved in treatment.     Readiness to Change - Current Risk Factor:  0    Goals: Understand the impact your substance use has had on you, your family and significant relationships.  Goals: Increase Internal Motivation.    Data:  Client appears in the preparation stage of change. Client is internally motivated for recovery. Client rated his motivation for sobriety at a 10 of 10 (10 highest motivation). Client reports he started a bucket list to do now that he has free time on his hands. Client reports going to a [place] race and a cruise are the first two on the list. Client reported him and his wife are going to continue adding things to the list. Client reports this is stuff he wanted to do, but was in a routine of drinking everyday.     DIMENSION 5:  Relapse/Continued Use/Continued Problem Potential  Consider the degree to which the client recognizes " "relapse issues and has the skills to prevent relapse of either substance use or mental health problems.     Relapse/Continued Use/Continued Problem Potential - Current Risk Factor:  1    Goals:   1. Develop sober coping and living skills.  2. Identify personal triggers and relapse warning signs.    Data:  Client denies all cravings and triggers this week. No change. Client reports \"he is done drinking, forever.\" Client reports he has no temptations, has been around others when using and reports he is truly happy with his life and is not going back.     DIMENSION 6:  Recovery Environment  Consider the degree to which key areas of the client's life are supportive of or antagonistic to treatment participation and recovery.     Recovery Environment - Current Risk Factor:  1    Support group attended this week:  No    Did family agree to attend family week: Yes - wife will attend    If yes:  none schedule this week - attended with wife    Goals:   1. Develop sober support network.  2. Resolve issues with employer.  3. Reflect on treatment experience, celebrate accomplishments, anticipate challenges in new sober lifestyle and set goal.     Data:  Client is employed full time.  Client lives with his wife at home. Client has sober support from his [employer], and his immediate family. Client reports that he will build his sober support network in the coming week by talking to his younger brother and his  at [employer]. Client has made progress in his sobriety, relationships and physical health since beginning treatment.  Client is only lacking in sober support meetings and having sober support outside of his family. Client rated his motivation to attend sober support meetings this week as a 0 of 10 (0 no motivation, 10 highest motivation). Client reported he feels like he doesn't need meetings. Client reports happy spending time with family, and is going to start having family dinners with his adult children.      "     Intervention:  Client shared his final assignments. Client received encouraging words from other group members. Counselor gave encouraging feedback for client to continue with sobriety.     Assessment:  Stages of Change Model  Preparation/Determination  Action     Client was an active participant and reported he was ready to complete treatment but would miss the group.   Client wiped his eyes while others were giving him feedback (possibly tearing up).     Plan:    Client successfully completed treatment.     HERRERA Ngo

## 2018-09-14 NOTE — TELEPHONE ENCOUNTER
6/12/2017    Monique from [facility] called to get an update on client.  I informed her client has completed phase 1 and will begin Phase 2 tomorrow and from here out he will be meeting with me 1x a week.  I told her it would be for 8 to 12 weeks, depending on how client is progressing. I told her I didn't have any concerns about client currently.   She thanked me and we ended the call.    HERRERA Ngo     [facility] pharmacy calling regarding Vantin prescription. Vantin related to Cephalosporins which patient has an allergy to. Please advise.